# Patient Record
Sex: FEMALE | Race: BLACK OR AFRICAN AMERICAN | NOT HISPANIC OR LATINO | URBAN - METROPOLITAN AREA
[De-identification: names, ages, dates, MRNs, and addresses within clinical notes are randomized per-mention and may not be internally consistent; named-entity substitution may affect disease eponyms.]

---

## 2017-10-29 ENCOUNTER — INPATIENT (INPATIENT)
Facility: HOSPITAL | Age: 82
LOS: 2 days | Discharge: HOME | DRG: 202 | End: 2017-11-01
Attending: FAMILY MEDICINE | Admitting: FAMILY MEDICINE
Payer: MEDICARE

## 2017-10-29 VITALS
HEIGHT: 64 IN | DIASTOLIC BLOOD PRESSURE: 74 MMHG | TEMPERATURE: 98 F | SYSTOLIC BLOOD PRESSURE: 140 MMHG | OXYGEN SATURATION: 95 % | HEART RATE: 83 BPM | RESPIRATION RATE: 24 BRPM | WEIGHT: 169.98 LBS

## 2017-10-29 DIAGNOSIS — Z29.9 ENCOUNTER FOR PROPHYLACTIC MEASURES, UNSPECIFIED: ICD-10-CM

## 2017-10-29 DIAGNOSIS — R05 COUGH: ICD-10-CM

## 2017-10-29 DIAGNOSIS — R07.89 OTHER CHEST PAIN: ICD-10-CM

## 2017-10-29 LAB
ANION GAP SERPL CALC-SCNC: 6 MMOL/L — SIGNIFICANT CHANGE UP (ref 5–17)
APPEARANCE UR: CLEAR — SIGNIFICANT CHANGE UP
APPEARANCE UR: CLEAR — SIGNIFICANT CHANGE UP
APTT BLD: 22.3 SEC — LOW (ref 27.5–37.4)
BACTERIA # UR AUTO: ABNORMAL /HPF
BASOPHILS # BLD AUTO: 0.1 K/UL — SIGNIFICANT CHANGE UP (ref 0–0.2)
BASOPHILS NFR BLD AUTO: 1.2 % — SIGNIFICANT CHANGE UP (ref 0–2)
BILIRUB UR-MCNC: NEGATIVE — SIGNIFICANT CHANGE UP
BILIRUB UR-MCNC: NEGATIVE — SIGNIFICANT CHANGE UP
BUN SERPL-MCNC: 16 MG/DL — SIGNIFICANT CHANGE UP (ref 7–18)
CALCIUM SERPL-MCNC: 9.7 MG/DL — SIGNIFICANT CHANGE UP (ref 8.4–10.5)
CHLORIDE SERPL-SCNC: 106 MMOL/L — SIGNIFICANT CHANGE UP (ref 96–108)
CK MB BLD-MCNC: 0.9 % — SIGNIFICANT CHANGE UP (ref 0–3.5)
CK MB BLD-MCNC: 1.4 % — SIGNIFICANT CHANGE UP (ref 0–3.5)
CK MB CFR SERPL CALC: 1.2 NG/ML — SIGNIFICANT CHANGE UP (ref 0–3.6)
CK MB CFR SERPL CALC: 1.7 NG/ML — SIGNIFICANT CHANGE UP (ref 0–3.6)
CK SERPL-CCNC: 124 U/L — SIGNIFICANT CHANGE UP (ref 21–215)
CK SERPL-CCNC: 131 U/L — SIGNIFICANT CHANGE UP (ref 21–215)
CO2 SERPL-SCNC: 27 MMOL/L — SIGNIFICANT CHANGE UP (ref 22–31)
COLOR SPEC: YELLOW — SIGNIFICANT CHANGE UP
COLOR SPEC: YELLOW — SIGNIFICANT CHANGE UP
CREAT SERPL-MCNC: 0.62 MG/DL — SIGNIFICANT CHANGE UP (ref 0.5–1.3)
DIFF PNL FLD: NEGATIVE — SIGNIFICANT CHANGE UP
DIFF PNL FLD: NEGATIVE — SIGNIFICANT CHANGE UP
EOSINOPHIL # BLD AUTO: 0.2 K/UL — SIGNIFICANT CHANGE UP (ref 0–0.5)
EOSINOPHIL NFR BLD AUTO: 2.8 % — SIGNIFICANT CHANGE UP (ref 0–6)
EPI CELLS # UR: SIGNIFICANT CHANGE UP /HPF
GLUCOSE SERPL-MCNC: 79 MG/DL — SIGNIFICANT CHANGE UP (ref 70–99)
GLUCOSE UR QL: 50 MG/DL
GLUCOSE UR QL: 50 MG/DL
HCT VFR BLD CALC: 44.2 % — SIGNIFICANT CHANGE UP (ref 34.5–45)
HGB BLD-MCNC: 14.4 G/DL — SIGNIFICANT CHANGE UP (ref 11.5–15.5)
INR BLD: 0.99 RATIO — SIGNIFICANT CHANGE UP (ref 0.88–1.16)
KETONES UR-MCNC: NEGATIVE — SIGNIFICANT CHANGE UP
KETONES UR-MCNC: NEGATIVE — SIGNIFICANT CHANGE UP
LEUKOCYTE ESTERASE UR-ACNC: NEGATIVE — SIGNIFICANT CHANGE UP
LEUKOCYTE ESTERASE UR-ACNC: NEGATIVE — SIGNIFICANT CHANGE UP
LYMPHOCYTES # BLD AUTO: 2.5 K/UL — SIGNIFICANT CHANGE UP (ref 1–3.3)
LYMPHOCYTES # BLD AUTO: 37.3 % — SIGNIFICANT CHANGE UP (ref 13–44)
MCHC RBC-ENTMCNC: 31.2 PG — SIGNIFICANT CHANGE UP (ref 27–34)
MCHC RBC-ENTMCNC: 32.6 GM/DL — SIGNIFICANT CHANGE UP (ref 32–36)
MCV RBC AUTO: 95.7 FL — SIGNIFICANT CHANGE UP (ref 80–100)
MONOCYTES # BLD AUTO: 0.6 K/UL — SIGNIFICANT CHANGE UP (ref 0–0.9)
MONOCYTES NFR BLD AUTO: 9.5 % — SIGNIFICANT CHANGE UP (ref 2–14)
NEUTROPHILS # BLD AUTO: 3.3 K/UL — SIGNIFICANT CHANGE UP (ref 1.8–7.4)
NEUTROPHILS NFR BLD AUTO: 49.2 % — SIGNIFICANT CHANGE UP (ref 43–77)
NITRITE UR-MCNC: NEGATIVE — SIGNIFICANT CHANGE UP
NITRITE UR-MCNC: NEGATIVE — SIGNIFICANT CHANGE UP
NT-PROBNP SERPL-SCNC: 235 PG/ML — SIGNIFICANT CHANGE UP (ref 0–450)
PH UR: 5 — SIGNIFICANT CHANGE UP (ref 5–8)
PH UR: 5 — SIGNIFICANT CHANGE UP (ref 5–8)
PLATELET # BLD AUTO: 212 K/UL — SIGNIFICANT CHANGE UP (ref 150–400)
POTASSIUM SERPL-MCNC: 4.1 MMOL/L — SIGNIFICANT CHANGE UP (ref 3.5–5.3)
POTASSIUM SERPL-SCNC: 4.1 MMOL/L — SIGNIFICANT CHANGE UP (ref 3.5–5.3)
PROT UR-MCNC: NEGATIVE — SIGNIFICANT CHANGE UP
PROT UR-MCNC: NEGATIVE — SIGNIFICANT CHANGE UP
PROTHROM AB SERPL-ACNC: 10.8 SEC — SIGNIFICANT CHANGE UP (ref 9.8–12.7)
RAPID RVP RESULT: SIGNIFICANT CHANGE UP
RBC # BLD: 4.62 M/UL — SIGNIFICANT CHANGE UP (ref 3.8–5.2)
RBC # FLD: 13.5 % — SIGNIFICANT CHANGE UP (ref 10.3–14.5)
RBC CASTS # UR COMP ASSIST: SIGNIFICANT CHANGE UP /HPF (ref 0–2)
SODIUM SERPL-SCNC: 139 MMOL/L — SIGNIFICANT CHANGE UP (ref 135–145)
SP GR SPEC: 1.01 — SIGNIFICANT CHANGE UP (ref 1.01–1.02)
SP GR SPEC: 1.05 — HIGH (ref 1.01–1.02)
TROPONIN I SERPL-MCNC: <0.015 NG/ML — SIGNIFICANT CHANGE UP (ref 0–0.04)
TROPONIN I SERPL-MCNC: <0.015 NG/ML — SIGNIFICANT CHANGE UP (ref 0–0.04)
UROBILINOGEN FLD QL: NEGATIVE — SIGNIFICANT CHANGE UP
UROBILINOGEN FLD QL: NEGATIVE — SIGNIFICANT CHANGE UP
WBC # BLD: 6.7 K/UL — SIGNIFICANT CHANGE UP (ref 3.8–10.5)
WBC # FLD AUTO: 6.7 K/UL — SIGNIFICANT CHANGE UP (ref 3.8–10.5)
WBC UR QL: SIGNIFICANT CHANGE UP /HPF (ref 0–5)

## 2017-10-29 PROCEDURE — 99285 EMERGENCY DEPT VISIT HI MDM: CPT

## 2017-10-29 PROCEDURE — 71020: CPT | Mod: 26

## 2017-10-29 RX ORDER — ASPIRIN/CALCIUM CARB/MAGNESIUM 324 MG
81 TABLET ORAL DAILY
Qty: 0 | Refills: 0 | Status: DISCONTINUED | OUTPATIENT
Start: 2017-10-30 | End: 2017-11-01

## 2017-10-29 RX ORDER — ALBUTEROL 90 UG/1
2 AEROSOL, METERED ORAL
Qty: 1 | Refills: 0 | OUTPATIENT
Start: 2017-10-29 | End: 2017-11-28

## 2017-10-29 RX ORDER — ASPIRIN/CALCIUM CARB/MAGNESIUM 324 MG
325 TABLET ORAL ONCE
Qty: 0 | Refills: 0 | Status: COMPLETED | OUTPATIENT
Start: 2017-10-29 | End: 2017-10-29

## 2017-10-29 RX ORDER — ENOXAPARIN SODIUM 100 MG/ML
40 INJECTION SUBCUTANEOUS DAILY
Qty: 0 | Refills: 0 | Status: DISCONTINUED | OUTPATIENT
Start: 2017-10-29 | End: 2017-11-01

## 2017-10-29 RX ORDER — ALBUTEROL 90 UG/1
3 AEROSOL, METERED ORAL
Qty: 30 | Refills: 0 | OUTPATIENT
Start: 2017-10-29 | End: 2017-11-28

## 2017-10-29 RX ORDER — ATORVASTATIN CALCIUM 80 MG/1
40 TABLET, FILM COATED ORAL AT BEDTIME
Qty: 0 | Refills: 0 | Status: DISCONTINUED | OUTPATIENT
Start: 2017-10-29 | End: 2017-11-01

## 2017-10-29 RX ORDER — ALBUTEROL 90 UG/1
1 AEROSOL, METERED ORAL EVERY 4 HOURS
Qty: 0 | Refills: 0 | Status: DISCONTINUED | OUTPATIENT
Start: 2017-10-29 | End: 2017-10-29

## 2017-10-29 RX ORDER — IPRATROPIUM/ALBUTEROL SULFATE 18-103MCG
3 AEROSOL WITH ADAPTER (GRAM) INHALATION
Qty: 0 | Refills: 0 | Status: COMPLETED | OUTPATIENT
Start: 2017-10-29 | End: 2017-10-29

## 2017-10-29 RX ORDER — TIOTROPIUM BROMIDE 18 UG/1
1 CAPSULE ORAL; RESPIRATORY (INHALATION) DAILY
Qty: 0 | Refills: 0 | Status: DISCONTINUED | OUTPATIENT
Start: 2017-10-29 | End: 2017-11-01

## 2017-10-29 RX ORDER — IPRATROPIUM/ALBUTEROL SULFATE 18-103MCG
3 AEROSOL WITH ADAPTER (GRAM) INHALATION EVERY 6 HOURS
Qty: 0 | Refills: 0 | Status: DISCONTINUED | OUTPATIENT
Start: 2017-10-29 | End: 2017-11-01

## 2017-10-29 RX ORDER — SODIUM CHLORIDE 9 MG/ML
3 INJECTION INTRAMUSCULAR; INTRAVENOUS; SUBCUTANEOUS ONCE
Qty: 0 | Refills: 0 | Status: COMPLETED | OUTPATIENT
Start: 2017-10-29 | End: 2017-10-29

## 2017-10-29 RX ADMIN — Medication 125 MILLIGRAM(S): at 15:37

## 2017-10-29 RX ADMIN — Medication 3 MILLILITER(S): at 17:10

## 2017-10-29 RX ADMIN — Medication 3 MILLILITER(S): at 15:37

## 2017-10-29 RX ADMIN — Medication 3 MILLILITER(S): at 15:28

## 2017-10-29 RX ADMIN — ATORVASTATIN CALCIUM 40 MILLIGRAM(S): 80 TABLET, FILM COATED ORAL at 21:25

## 2017-10-29 RX ADMIN — SODIUM CHLORIDE 3 MILLILITER(S): 9 INJECTION INTRAMUSCULAR; INTRAVENOUS; SUBCUTANEOUS at 15:29

## 2017-10-29 RX ADMIN — Medication 325 MILLIGRAM(S): at 19:00

## 2017-10-29 NOTE — H&P ADULT - HISTORY OF PRESENT ILLNESS
81yo F with h/o Crohns disease, asthma, breast cancer s/p mastectomy (1988, and 2015), arthritis, and GERD came in with c/o nonproductive cough, wheezing, and SOB since the past couple of days. She said she has SOB at rest and exertion. Denies chest pain or tightness,. She said she has been using her albuterol inhaler but the cough has been persistent. Denies any recent use of steroids or antibiotics. Denies fevers, chills, nausea, or vomiting.  Patient does not remember her medication list. She said she was at friend's home who recommended her to come to ER due to persistent cough and hoarse voice. She also does not remember the name of her pharmacy. She said she walks independently and is active at baseline. She lives by herself and  does the household work. Also drives a car and does her grocery. Denies smoking, alcohol or any other drug use.

## 2017-10-29 NOTE — H&P ADULT - ATTENDING COMMENTS
Patient seen and examined. Case fully discussed with  ER attending and medical resident. Reviewed chief complaint. Reviewed review of systems. Reviewed list of medications.  Reviewed physical exam.  Reviewed assessment and plan. agree with full H and P. Will follow up clinically.

## 2017-10-29 NOTE — H&P ADULT - NSHPPHYSICALEXAM_GEN_ALL_CORE
Vital Signs Last 24 Hrs  T(C): 36.6 (29 Oct 2017 14:14), Max: 36.6 (29 Oct 2017 14:14)  T(F): 97.9 (29 Oct 2017 14:14), Max: 97.9 (29 Oct 2017 14:14)  HR: 83 (29 Oct 2017 14:14) (83 - 83)  BP: 140/74 (29 Oct 2017 14:14) (140/74 - 140/74)  BP(mean): --  RR: 24 (29 Oct 2017 14:14) (24 - 24)  SpO2: 95% (29 Oct 2017 14:14) (95% - 95%)    GENERAL: NAD  HEAD:  Atraumatic, Normocephalic  EYES: EOMI, PERRLA, conjunctiva and sclera clear  ENMT: Moist mucous membranes  NECK: Supple  NERVOUS SYSTEM:  Alert & Oriented X3  CHEST/LUNG: Clear to auscultation bilaterally; No rales, rhonchi, wheezing, or rubs  HEART: Regular rate and rhythm; No murmurs, rubs, or gallops  ABDOMEN: Soft, Nontender, Nondistended; Bowel sounds present  EXTREMITIES:  2+ Peripheral Pulses, No clubbing, cyanosis, or edema

## 2017-10-29 NOTE — H&P ADULT - PROBLEM SELECTOR PLAN 2
Per ED, patient was complaining of chest tightness  - denies chest pain or tightness on my initial encounter  - EKG-NSR  - T1- negative  - Trend CE  - c/w ASA and Statin

## 2017-10-29 NOTE — ED PROVIDER NOTE - OBJECTIVE STATEMENT
83 y/o F pt with PMHx of asthma presents to ED c/o worsening nonproductive cough and wheezing that worsens with exertion x 2 days. Pt relays that she used albuterol with mild symptomatic relief. Pt reports that she is looking for a pulmonologist. Pt denies chest pain, dizziness, leg swelling, fever, chills, or any other complaints. ALLERGIES: penicillin (unknown). 83 y/o F pt with PMHx of asthma presents to ED c/o worsening nonproductive cough and wheezing that worsens with exertion x 2 days. Pt relays that she used albuterol with mild symptomatic relief. Later reports chest pressure and heaviness over saem time, worse with lying position. Pt reports that she is looking for a pulmonologist. Pt denies leg swelling, fever, chills, or any other complaints. ALLERGIES: penicillin (unknown).

## 2017-10-29 NOTE — H&P ADULT - NSHPLABSRESULTS_GEN_ALL_CORE
14.4   6.7   )-----------( 212      ( 29 Oct 2017 15:29 )             44.2   10-29    139  |  106  |  16  ----------------------------<  79  4.1   |  27  |  0.62    Ca    9.7      29 Oct 2017 15:28      < from: Xray Chest 2 Views PA/Lat (10.29.17 @ 15:55) >      EXAM:  CHEST PA & LAT                            PROCEDURE DATE:  10/29/2017          INTERPRETATION:  CLINICAL INDICATION: Pneumonia.    PA and lateral views of the chest are provided.    COMPARISON: Chest x-ray 1/8/2015.    FINDINGS: The lungs are clear. Costophrenic angles are sharp. The   cardiomediastinal silhouette is within normal limits of size.   Degenerative changes of the spine are present.    IMPRESSION: Clear lungs.        < end of copied text >

## 2017-10-29 NOTE — H&P ADULT - ASSESSMENT
83yo F with h/o Crohns disease, asthma, breast cancer s/p mastectomy (1988, and 2015), arthritis, and GERD came in with c/o nonproductive cough, wheezing, and SOB since the past couple of days. She said she has SOB at rest and exertion. Denies chest pain or tightness,. She said she has been using her albuterol inhaler but the cough has been persistent. Denies any recent use of steroids or antibiotics. Denies fevers, chills, nausea, or vomiting.  Patient does not remember her medication list. She said she was at friend's home who recommended her to come to ER due to persistent cough and hoarse voice. She also does not remember the name of her pharmacy. She said she walks independently and is active at baseline. She lives by herself and  does the household work. Also drives a car and does her grocery. Denies smoking, alcohol or any other drug use.     PRIMARY TEAM TO PLEASE OBTAIN MEDICATION LIST IN AM FROM THE PHARMACY.

## 2017-10-29 NOTE — H&P ADULT - PROBLEM SELECTOR PLAN 1
Asthma exacerbation vs Viral bronchitis  - f/u RVP  - duonebs  - Prednisone 40mg qday x 5 days  - benzonatate  - guaifenesin  - Unlikely cardiac- patient said she had outpatient workup done recently which was normal. BNP- wnl. EKG- NSR, T1- negative. Denies any chest pain or tightness

## 2017-10-29 NOTE — ED PROVIDER NOTE - CHPI ED SYMPTOMS NEG
no fever/no chills/no chest pain/no dizziness, no leg swelling no fever/no chills/no dizziness, no leg swelling

## 2017-10-29 NOTE — H&P ADULT - NSHPREVIEWOFSYSTEMS_GEN_ALL_CORE
REVIEW OF SYSTEMS:  CONSTITUTIONAL: No fever, weight loss, or fatigue  EYES: No eye pain, visual disturbances, or discharge  ENMT:  No difficulty hearing, tinnitus, vertigo; No sinus or throat pain  NECK: No pain or stiffness  RESPIRATORY:  cough, SOB, No wheezing, chills or hemoptysis  CARDIOVASCULAR: No chest pain, palpitations, dizziness, or leg swelling  GASTROINTESTINAL: No abdominal or epigastric pain. No nausea, vomiting, or hematemesis; No diarrhea or constipation. No melena or hematochezia.  GENITOURINARY: No dysuria, frequency, hematuria, or incontinence  NEUROLOGICAL: No headaches, memory loss, loss of strength, numbness, or tremors  SKIN: No itching, burning, rashes, or lesions   LYMPH NODES: No enlarged glands  ENDOCRINE: No heat or cold intolerance; No hair loss  MUSCULOSKELETAL: No joint pain or swelling; No muscle, back, or extremity pain

## 2017-10-29 NOTE — ED PROVIDER NOTE - PROGRESS NOTE DETAILS
Pt's symptoms have improved. Pt reports that she would like to go home. On reexamination, lung are clear. Per pt's request, f/u with pulmonologist was given. Pt improved s/p nebs, agrees to admission for cardiac w/u, improvement of symptoms.

## 2017-10-29 NOTE — ED PROVIDER NOTE - MEDICAL DECISION MAKING DETAILS
83 y/o F pt with PMHx of asthma presents with nonproductive cough and wheezing. Will obtain CXR r/o PNA, give trial of NEBS and reassess.

## 2017-10-29 NOTE — ED ADULT TRIAGE NOTE - CHIEF COMPLAINT QUOTE
as per the pt " I have the difficulty in breathing for 2 days " pt has h/o heart  murmurs. I have the asthma history

## 2017-10-30 LAB
ANION GAP SERPL CALC-SCNC: 8 MMOL/L — SIGNIFICANT CHANGE UP (ref 5–17)
BASOPHILS # BLD AUTO: 0.1 K/UL — SIGNIFICANT CHANGE UP (ref 0–0.2)
BASOPHILS NFR BLD AUTO: 0.7 % — SIGNIFICANT CHANGE UP (ref 0–2)
BUN SERPL-MCNC: 19 MG/DL — HIGH (ref 7–18)
CALCIUM SERPL-MCNC: 9.9 MG/DL — SIGNIFICANT CHANGE UP (ref 8.4–10.5)
CHLORIDE SERPL-SCNC: 104 MMOL/L — SIGNIFICANT CHANGE UP (ref 96–108)
CHOLEST SERPL-MCNC: 250 MG/DL — HIGH (ref 10–199)
CO2 SERPL-SCNC: 25 MMOL/L — SIGNIFICANT CHANGE UP (ref 22–31)
CREAT SERPL-MCNC: 0.79 MG/DL — SIGNIFICANT CHANGE UP (ref 0.5–1.3)
EOSINOPHIL # BLD AUTO: 0 K/UL — SIGNIFICANT CHANGE UP (ref 0–0.5)
EOSINOPHIL NFR BLD AUTO: 0 % — SIGNIFICANT CHANGE UP (ref 0–6)
GLUCOSE BLDC GLUCOMTR-MCNC: 160 MG/DL — HIGH (ref 70–99)
GLUCOSE SERPL-MCNC: 137 MG/DL — HIGH (ref 70–99)
HCT VFR BLD CALC: 45.2 % — HIGH (ref 34.5–45)
HDLC SERPL-MCNC: 83 MG/DL — SIGNIFICANT CHANGE UP (ref 40–125)
HGB BLD-MCNC: 14.5 G/DL — SIGNIFICANT CHANGE UP (ref 11.5–15.5)
LIPID PNL WITH DIRECT LDL SERPL: 158 MG/DL — SIGNIFICANT CHANGE UP
LYMPHOCYTES # BLD AUTO: 1.8 K/UL — SIGNIFICANT CHANGE UP (ref 1–3.3)
LYMPHOCYTES # BLD AUTO: 21 % — SIGNIFICANT CHANGE UP (ref 13–44)
MCHC RBC-ENTMCNC: 31.2 PG — SIGNIFICANT CHANGE UP (ref 27–34)
MCHC RBC-ENTMCNC: 32.1 GM/DL — SIGNIFICANT CHANGE UP (ref 32–36)
MCV RBC AUTO: 97 FL — SIGNIFICANT CHANGE UP (ref 80–100)
MONOCYTES # BLD AUTO: 0.4 K/UL — SIGNIFICANT CHANGE UP (ref 0–0.9)
MONOCYTES NFR BLD AUTO: 4.9 % — SIGNIFICANT CHANGE UP (ref 2–14)
NEUTROPHILS # BLD AUTO: 6.1 K/UL — SIGNIFICANT CHANGE UP (ref 1.8–7.4)
NEUTROPHILS NFR BLD AUTO: 73.3 % — SIGNIFICANT CHANGE UP (ref 43–77)
PLATELET # BLD AUTO: 199 K/UL — SIGNIFICANT CHANGE UP (ref 150–400)
POTASSIUM SERPL-MCNC: 4.2 MMOL/L — SIGNIFICANT CHANGE UP (ref 3.5–5.3)
POTASSIUM SERPL-SCNC: 4.2 MMOL/L — SIGNIFICANT CHANGE UP (ref 3.5–5.3)
RBC # BLD: 4.66 M/UL — SIGNIFICANT CHANGE UP (ref 3.8–5.2)
RBC # FLD: 13.5 % — SIGNIFICANT CHANGE UP (ref 10.3–14.5)
SODIUM SERPL-SCNC: 137 MMOL/L — SIGNIFICANT CHANGE UP (ref 135–145)
TOTAL CHOLESTEROL/HDL RATIO MEASUREMENT: 3 RATIO — LOW (ref 3.3–7.1)
TRIGL SERPL-MCNC: 44 MG/DL — SIGNIFICANT CHANGE UP (ref 10–149)
WBC # BLD: 8.3 K/UL — SIGNIFICANT CHANGE UP (ref 3.8–10.5)
WBC # FLD AUTO: 8.3 K/UL — SIGNIFICANT CHANGE UP (ref 3.8–10.5)

## 2017-10-30 RX ADMIN — Medication 40 MILLIGRAM(S): at 07:01

## 2017-10-30 RX ADMIN — Medication 200 MILLIGRAM(S): at 07:00

## 2017-10-30 RX ADMIN — Medication 3 MILLILITER(S): at 20:58

## 2017-10-30 RX ADMIN — Medication 100 MILLIGRAM(S): at 12:52

## 2017-10-30 RX ADMIN — Medication 3 MILLILITER(S): at 15:18

## 2017-10-30 RX ADMIN — Medication 200 MILLIGRAM(S): at 12:55

## 2017-10-30 RX ADMIN — Medication 81 MILLIGRAM(S): at 12:51

## 2017-10-30 RX ADMIN — ENOXAPARIN SODIUM 40 MILLIGRAM(S): 100 INJECTION SUBCUTANEOUS at 12:52

## 2017-10-30 NOTE — PROGRESS NOTE ADULT - SUBJECTIVE AND OBJECTIVE BOX
CHIEF COMPLAINT:Patient is a 82y old  Female who presents with a chief complaint of cough (29 Oct 2017 19:36)    	  REVIEW OF SYSTEMS:  CONSTITUTIONAL: No fever, weight loss, or fatigue  EYES: No eye pain, visual disturbances, or discharge  ENMT:  No difficulty hearing, tinnitus, vertigo; No sinus or throat pain  NECK: No pain or stiffness  RESPIRATORY: No cough, wheezing, chills or hemoptysis; No Shortness of Breath  CARDIOVASCULAR: No chest pain, palpitations, passing out, dizziness, or leg swelling  GASTROINTESTINAL: No abdominal or epigastric pain. No nausea, vomiting, or hematemesis; No diarrhea or constipation. No melena or hematochezia.  GENITOURINARY: No dysuria, frequency, hematuria, or incontinence  NEUROLOGICAL: No headaches, memory loss, loss of strength, numbness, or tremors  SKIN: No itching, burning, rashes, or lesions   LYMPH Nodes: No enlarged glands  ENDOCRINE: No heat or cold intolerance; No hair loss  MUSCULOSKELETAL: No joint pain or swelling; No muscle, back, or extremity pain  PSYCHIATRIC: No depression, anxiety, mood swings, or difficulty sleeping  HEME/LYMPH: No easy bruising, or bleeding gums  ALLERY AND IMMUNOLOGIC: No hives or eczema	    [ ] All others negative	  [ ] Unable to obtain    PHYSICAL EXAM:  T(C): 36.6 (10-30-17 @ 22:09), Max: 36.8 (10-30-17 @ 05:42)  HR: 81 (10-30-17 @ 22:09) (81 - 96)  BP: 99/50 (10-30-17 @ 22:09) (99/50 - 127/80)  RR: 16 (10-30-17 @ 22:09) (16 - 22)  SpO2: 96% (10-30-17 @ 22:09) (92% - 99%)  Wt(kg): --  I&O's Summary      Appearance: Normal	  HEENT:   Normal oral mucosa, PERRL, EOMI	  Lymphatic: No lymphadenopathy  Cardiovascular: Normal S1 S2, No JVD, No murmurs, No edema  Respiratory: Lungs clear to auscultation	  Psychiatry: A & O x 3, Mood & affect appropriate  Gastrointestinal:  Soft, Non-tender, + BS	  Skin: No rashes, No ecchymoses, No cyanosis	  Neurologic: Non-focal  Extremities: Normal range of motion, No clubbing, cyanosis or edema  Vascular: Peripheral pulses palpable 2+ bilaterally    MEDICATIONS  (STANDING):  ALBUTerol/ipratropium for Nebulization 3 milliLiter(s) Nebulizer every 6 hours  aspirin  chewable 81 milliGRAM(s) Oral daily  atorvastatin 40 milliGRAM(s) Oral at bedtime  benzonatate 100 milliGRAM(s) Oral daily  enoxaparin Injectable 40 milliGRAM(s) SubCutaneous daily  guaiFENesin    Syrup 200 milliGRAM(s) Oral every 6 hours  predniSONE   Tablet 40 milliGRAM(s) Oral daily  tiotropium 18 MICROgram(s) Capsule 1 Capsule(s) Inhalation daily      TELEMETRY: 	    ECG:  	  RADIOLOGY:  OTHER: 	  	  CBC Full  -  ( 30 Oct 2017 06:27 )  WBC Count : 8.3 K/uL  Hemoglobin : 14.5 g/dL  Hematocrit : 45.2 %  Platelet Count - Automated : 199 K/uL  Mean Cell Volume : 97.0 fl  Mean Cell Hemoglobin : 31.2 pg  Mean Cell Hemoglobin Concentration : 32.1 gm/dL  Auto Neutrophil # : 6.1 K/uL  Auto Lymphocyte # : 1.8 K/uL  Auto Monocyte # : 0.4 K/uL  Auto Eosinophil # : 0.0 K/uL  Auto Basophil # : 0.1 K/uL  Auto Neutrophil % : 73.3 %  Auto Lymphocyte % : 21.0 %  Auto Monocyte % : 4.9 %  Auto Eosinophil % : 0.0 %  Auto Basophil % : 0.7 %        CARDIAC MARKERS:  CARDIAC MARKERS ( 29 Oct 2017 21:44 )  <0.015 ng/mL / x     / 131 U/L / x     / 1.2 ng/mL  CARDIAC MARKERS ( 29 Oct 2017 15:28 )  <0.015 ng/mL / x     / 124 U/L / x     / 1.7 ng/mL                              14.5   8.3   )-----------( 199      ( 30 Oct 2017 06:27 )             45.2       10-30    137  |  104  |  19<H>  ----------------------------<  137<H>  4.2   |  25  |  0.79    Ca    9.9      30 Oct 2017 06:27        PT/INR - ( 29 Oct 2017 15:29 )   PT: 10.8 sec;   INR: 0.99 ratio         PTT - ( 29 Oct 2017 15:29 )  PTT:22.3 sec    Urinalysis Basic - ( 29 Oct 2017 23:21 )    Color: Yellow / Appearance: Clear / S.050 / pH: x  Gluc: x / Ketone: Negative  / Bili: Negative / Urobili: Negative   Blood: x / Protein: Negative / Nitrite: Negative   Leuk Esterase: Negative / RBC: x / WBC x   Sq Epi: x / Non Sq Epi: x / Bacteria: x      proBNP: Serum Pro-Brain Natriuretic Peptide: 235 pg/mL (10-29 @ 15:28)    Lipid Profile: Cholesterol 250    HDL 83  TG 44    HgA1c:   TSH:

## 2017-10-30 NOTE — ED ADULT NURSE REASSESSMENT NOTE - NS ED NURSE REASSESS COMMENT FT1
at 8:10 am Pt demanded to leave ED to move her car NEO Matias and nurse manager Jackelin made aware IV Line tele box removed Pt promised to come back ASAP at 10AM RN called Pt phone # no answer NEO Matias and nurse in charge Michaela made aware. at 8:10 am Pt demanded to leave ED NP Polly and nurse manager Jackelin made aware IV Line tele box removed pt signed out AMA, risk and benefits explained to the patient, patient verbalized understanding

## 2017-10-30 NOTE — ED ADULT NURSE REASSESSMENT NOTE - NS ED NURSE REASSESS COMMENT FT1
Pt denies any discomfort at present attached to N/C 2lit/min telemetry D/C by NEO Garcia admited to 5S hand off report given to Melody WELLS

## 2017-10-30 NOTE — ED ADULT NURSE REASSESSMENT NOTE - NS ED NURSE REASSESS COMMENT FT1
REceived Pt alert verbally responsive NAD telemonitoring in progress pt transferred to holding endorsed to Netta WELLS REceived Pt alert verbally responsive NAD telemonitoring in progress

## 2017-10-30 NOTE — PROGRESS NOTE ADULT - ASSESSMENT
83yo F with h/o Crohns disease, asthma, breast cancer s/p mastectomy (1988, and 2015), arthritis, and GERD came in with c/o nonproductive cough, wheezing, and SOB since the past couple of days. She said she has SOB at rest and exertion. Denies chest pain or tightness,. She said she has been using her albuterol inhaler but the cough has been persistent. Denies any recent use of steroids or antibiotics. Denies fevers, chills, nausea, or vomiting.  Patient does not remember her medication list. She said she was at friend's home who recommended her to come to ER due to persistent cough and hoarse voice. She also does not remember the name of her pharmacy. She said she walks independently and is active at baseline. She lives by herself and  does the household work. Also drives a car and does her grocery. Denies smoking, alcohol or any other drug use.     PRIMARY TEAM TO PLEASE OBTAIN MEDICATION LIST IN AM FROM THE PHARMACY. Patient is more stable today . With no specific  complaints.

## 2017-10-31 LAB
ANION GAP SERPL CALC-SCNC: 7 MMOL/L — SIGNIFICANT CHANGE UP (ref 5–17)
BUN SERPL-MCNC: 20 MG/DL — HIGH (ref 7–18)
CALCIUM SERPL-MCNC: 9.5 MG/DL — SIGNIFICANT CHANGE UP (ref 8.4–10.5)
CHLORIDE SERPL-SCNC: 102 MMOL/L — SIGNIFICANT CHANGE UP (ref 96–108)
CO2 SERPL-SCNC: 29 MMOL/L — SIGNIFICANT CHANGE UP (ref 22–31)
CREAT SERPL-MCNC: 0.82 MG/DL — SIGNIFICANT CHANGE UP (ref 0.5–1.3)
GLUCOSE BLDC GLUCOMTR-MCNC: 125 MG/DL — HIGH (ref 70–99)
GLUCOSE SERPL-MCNC: 96 MG/DL — SIGNIFICANT CHANGE UP (ref 70–99)
HCT VFR BLD CALC: 45.3 % — HIGH (ref 34.5–45)
HGB BLD-MCNC: 14.9 G/DL — SIGNIFICANT CHANGE UP (ref 11.5–15.5)
MCHC RBC-ENTMCNC: 31.7 PG — SIGNIFICANT CHANGE UP (ref 27–34)
MCHC RBC-ENTMCNC: 32.8 GM/DL — SIGNIFICANT CHANGE UP (ref 32–36)
MCV RBC AUTO: 96.7 FL — SIGNIFICANT CHANGE UP (ref 80–100)
PLATELET # BLD AUTO: 198 K/UL — SIGNIFICANT CHANGE UP (ref 150–400)
POTASSIUM SERPL-MCNC: 4.2 MMOL/L — SIGNIFICANT CHANGE UP (ref 3.5–5.3)
POTASSIUM SERPL-SCNC: 4.2 MMOL/L — SIGNIFICANT CHANGE UP (ref 3.5–5.3)
RBC # BLD: 4.69 M/UL — SIGNIFICANT CHANGE UP (ref 3.8–5.2)
RBC # FLD: 13.4 % — SIGNIFICANT CHANGE UP (ref 10.3–14.5)
SODIUM SERPL-SCNC: 138 MMOL/L — SIGNIFICANT CHANGE UP (ref 135–145)
WBC # BLD: 9 K/UL — SIGNIFICANT CHANGE UP (ref 3.8–10.5)
WBC # FLD AUTO: 9 K/UL — SIGNIFICANT CHANGE UP (ref 3.8–10.5)

## 2017-10-31 RX ORDER — PANTOPRAZOLE SODIUM 20 MG/1
40 TABLET, DELAYED RELEASE ORAL
Qty: 0 | Refills: 0 | Status: DISCONTINUED | OUTPATIENT
Start: 2017-10-31 | End: 2017-11-01

## 2017-10-31 RX ORDER — PANTOPRAZOLE SODIUM 20 MG/1
40 TABLET, DELAYED RELEASE ORAL ONCE
Qty: 0 | Refills: 0 | Status: COMPLETED | OUTPATIENT
Start: 2017-10-31 | End: 2017-10-31

## 2017-10-31 RX ORDER — SENNA PLUS 8.6 MG/1
2 TABLET ORAL AT BEDTIME
Qty: 0 | Refills: 0 | Status: DISCONTINUED | OUTPATIENT
Start: 2017-10-31 | End: 2017-11-01

## 2017-10-31 RX ORDER — BENZOCAINE AND MENTHOL 5; 1 G/100ML; G/100ML
1 LIQUID ORAL
Qty: 0 | Refills: 0 | Status: COMPLETED | OUTPATIENT
Start: 2017-10-31 | End: 2017-11-01

## 2017-10-31 RX ORDER — MESALAMINE 400 MG
800 TABLET, DELAYED RELEASE (ENTERIC COATED) ORAL
Qty: 0 | Refills: 0 | Status: DISCONTINUED | OUTPATIENT
Start: 2017-10-31 | End: 2017-11-01

## 2017-10-31 RX ORDER — DOCUSATE SODIUM 100 MG
100 CAPSULE ORAL DAILY
Qty: 0 | Refills: 0 | Status: DISCONTINUED | OUTPATIENT
Start: 2017-10-31 | End: 2017-11-01

## 2017-10-31 RX ORDER — FLUTICASONE PROPIONATE 50 MCG
1 SPRAY, SUSPENSION NASAL
Qty: 0 | Refills: 0 | Status: DISCONTINUED | OUTPATIENT
Start: 2017-10-31 | End: 2017-11-01

## 2017-10-31 RX ORDER — LETROZOLE 2.5 MG/1
2.5 TABLET, FILM COATED ORAL DAILY
Qty: 0 | Refills: 0 | Status: DISCONTINUED | OUTPATIENT
Start: 2017-10-31 | End: 2017-11-01

## 2017-10-31 RX ADMIN — Medication 200 MILLIGRAM(S): at 00:31

## 2017-10-31 RX ADMIN — Medication 1 SPRAY(S): at 21:57

## 2017-10-31 RX ADMIN — Medication 200 MILLIGRAM(S): at 11:58

## 2017-10-31 RX ADMIN — Medication 40 MILLIGRAM(S): at 06:44

## 2017-10-31 RX ADMIN — ENOXAPARIN SODIUM 40 MILLIGRAM(S): 100 INJECTION SUBCUTANEOUS at 11:56

## 2017-10-31 RX ADMIN — Medication 3 MILLILITER(S): at 15:10

## 2017-10-31 RX ADMIN — Medication 200 MILLIGRAM(S): at 06:00

## 2017-10-31 RX ADMIN — BENZOCAINE AND MENTHOL 1 LOZENGE: 5; 1 LIQUID ORAL at 19:39

## 2017-10-31 RX ADMIN — Medication 100 MILLIGRAM(S): at 16:38

## 2017-10-31 RX ADMIN — BENZOCAINE AND MENTHOL 1 LOZENGE: 5; 1 LIQUID ORAL at 21:57

## 2017-10-31 RX ADMIN — Medication 1 SPRAY(S): at 07:07

## 2017-10-31 RX ADMIN — Medication 81 MILLIGRAM(S): at 11:57

## 2017-10-31 RX ADMIN — SENNA PLUS 2 TABLET(S): 8.6 TABLET ORAL at 22:47

## 2017-10-31 RX ADMIN — BENZOCAINE AND MENTHOL 1 LOZENGE: 5; 1 LIQUID ORAL at 18:00

## 2017-10-31 RX ADMIN — PANTOPRAZOLE SODIUM 40 MILLIGRAM(S): 20 TABLET, DELAYED RELEASE ORAL at 19:32

## 2017-10-31 RX ADMIN — Medication 100 MILLIGRAM(S): at 22:47

## 2017-10-31 RX ADMIN — PANTOPRAZOLE SODIUM 40 MILLIGRAM(S): 20 TABLET, DELAYED RELEASE ORAL at 06:44

## 2017-10-31 RX ADMIN — Medication 3 MILLILITER(S): at 20:28

## 2017-10-31 RX ADMIN — Medication 3 MILLILITER(S): at 09:15

## 2017-10-31 RX ADMIN — Medication 800 MILLIGRAM(S): at 09:11

## 2017-10-31 RX ADMIN — LETROZOLE 2.5 MILLIGRAM(S): 2.5 TABLET, FILM COATED ORAL at 11:57

## 2017-10-31 RX ADMIN — Medication 800 MILLIGRAM(S): at 16:41

## 2017-10-31 NOTE — PROGRESS NOTE ADULT - ASSESSMENT
81yo F with h/o Crohns disease, asthma, breast cancer s/p mastectomy (1988, and 2015), arthritis, and GERD came in with c/o nonproductive cough, wheezing, and SOB since the past couple of days. She said she has SOB at rest and exertion. Denies chest pain or tightness,. She said she has been using her albuterol inhaler but the cough has been persistent. Denies any recent use of steroids or antibiotics. Denies fevers, chills, nausea, or vomiting.  Patient does not remember her medication list. She said she was at friend's home who recommended her to come to ER due to persistent cough and hoarse voice. She also does not remember the name of her pharmacy. She said she walks independently and is active at baseline. She lives by herself and  does the household work. Also drives a car and does her grocery. Denies smoking, alcohol or any other drug use.     PRIMARY TEAM TO PLEASE OBTAIN MEDICATION LIST IN AM FROM THE PHARMACY. Patient is more stable today . With no specific  complaints. 83yo F with h/o Crohns disease, asthma, breast cancer s/p mastectomy (1988, and 2015), arthritis, and GERD came in with c/o nonproductive cough, wheezing, and SOB x 2 days. Pt admitted for suspected asthma exacerbation    Pt currently feels well and presents in no acute distress.  Will continue duoneb and oral steroid therapy for now.

## 2017-10-31 NOTE — PROGRESS NOTE ADULT - ATTENDING COMMENTS
Patient is seen and examined. Case reviewed with the medical team. Above note is appreciated. Will follow up clinically. Continue DVT prophylaxis. Will continue inhalers and follow up with Pulmonary for consultation.
Patient is seen and examined. Case reviewed with the medical team. Above note is appreciated. Will follow up clinically. Continue DVT prophylaxis. Case discussed with Cardiology. Continue inhalers.

## 2017-10-31 NOTE — PROGRESS NOTE ADULT - PROBLEM SELECTOR PLAN 2
Per ED, patient was complaining of chest tightness  - denies chest pain or tightness on my initial encounter  - EKG-NSR  - T1- negative  - Trend CE  - c/w ASA and Statin Per ED, patient was complaining of chest tightness  - denies chest pain or tightness  - EKG-NSR  - Trops neg x 3  - c/w ASA and Statin

## 2017-10-31 NOTE — PROVIDER CONTACT NOTE (OTHER) - ACTION/TREATMENT ORDERED:
DR. Monk said BP is alright. no IVF to give. he saw and talked with the pt. and he ordered  some medication for 0600. she refused LIPITOR 40 MG PO. Dr. Monk notified.

## 2017-10-31 NOTE — PROGRESS NOTE ADULT - PROBLEM SELECTOR PLAN 1
Asthma exacerbation vs Viral bronchitis  - f/u RVP  - duonebs  - Prednisone 40mg qday x 5 days  - benzonatate  - guaifenesin  - Unlikely cardiac- patient said she had outpatient workup done recently which was normal. BNP- wnl. EKG- NSR, T1- negative. Denies any chest pain or tightness Asthma exacerbation vs Viral bronchitis  - f/u RVP  - duonebs  - Prednisone 40mg qday x 5 days  - benzonatate  - guaifenesin  - Unlikely cardiac- patient said she had outpatient workup done recently which was normal. BNP- wnl. EKG- NSR, T1- negative. Denies any chest pain or tightness  -Pulm: Dr. Adam

## 2017-10-31 NOTE — PROGRESS NOTE ADULT - SUBJECTIVE AND OBJECTIVE BOX
PGY 1 Note discussed with supervising resident and primary attending    Patient is a 82y old  Female who presents with a chief complaint of cough (29 Oct 2017 19:36)      INTERVAL HPI/OVERNIGHT EVENTS: no new complaints    MEDICATIONS  (STANDING):  ALBUTerol/ipratropium for Nebulization 3 milliLiter(s) Nebulizer every 6 hours  aspirin  chewable 81 milliGRAM(s) Oral daily  atorvastatin 40 milliGRAM(s) Oral at bedtime  benzonatate 100 milliGRAM(s) Oral daily  enoxaparin Injectable 40 milliGRAM(s) SubCutaneous daily  fluticasone propionate 50 MICROgram(s)/spray Nasal Spray 1 Spray(s) Both Nostrils two times a day  guaiFENesin    Syrup 200 milliGRAM(s) Oral every 6 hours  letrozole 2.5 milliGRAM(s) Oral daily  mesalamine DR Capsule 800 milliGRAM(s) Oral two times a day  pantoprazole    Tablet 40 milliGRAM(s) Oral before breakfast  predniSONE   Tablet 40 milliGRAM(s) Oral daily  tiotropium 18 MICROgram(s) Capsule 1 Capsule(s) Inhalation daily    MEDICATIONS  (PRN):      __________________________________________________  REVIEW OF SYSTEMS:    CONSTITUTIONAL: No fever,   EYES: no acute visual disturbances  NECK: No pain or stiffness  RESPIRATORY: No cough; No shortness of breath  CARDIOVASCULAR: No chest pain, no palpitations  GASTROINTESTINAL: No pain. No nausea or vomiting; No diarrhea   NEUROLOGICAL: No headache or numbness, no tremors  MUSCULOSKELETAL: No joint pain, no muscle pain  GENITOURINARY: no dysuria, no frequency, no hesitancy  PSYCHIATRY: no depression , no anxiety  ALL OTHER  ROS negative        Vital Signs Last 24 Hrs  T(C): 36.9 (31 Oct 2017 05:23), Max: 36.9 (31 Oct 2017 05:23)  T(F): 98.5 (31 Oct 2017 05:23), Max: 98.5 (31 Oct 2017 05:23)  HR: 60 (31 Oct 2017 05:23) (60 - 96)  BP: 112/78 (31 Oct 2017 05:23) (99/50 - 121/69)  BP(mean): --  RR: 18 (31 Oct 2017 05:23) (16 - 22)  SpO2: 96% (31 Oct 2017 05:23) (92% - 97%)    ________________________________________________  PHYSICAL EXAM:  GENERAL: NAD  HEENT:Normocephalic;  conjunctivae and sclerae clear; moist mucous membranes;   NECK : supple  CHEST/LUNG: Clear to auscuitation bilaterally with good air entry   HEART: S1 S2  regular; no murmurs, gallops or rubs  ABDOMEN: Soft, Nontender, Nondistended; Bowel sounds present  EXTREMITIES: no cyanosis; no edema; no calf tenderness  NERVOUS SYSTEM:  Awake and alert; Oriented  to place, person and time ; no new deficits    _________________________________________________  LABS:                        14.5   8.3   )-----------( 199      ( 30 Oct 2017 06:27 )             45.2     10-30    137  |  104  |  19<H>  ----------------------------<  137<H>  4.2   |  25  |  0.79    Ca    9.9      30 Oct 2017 06:27      PT/INR - ( 29 Oct 2017 15:29 )   PT: 10.8 sec;   INR: 0.99 ratio         PTT - ( 29 Oct 2017 15:29 )  PTT:22.3 sec  Urinalysis Basic - ( 29 Oct 2017 23:21 )    Color: Yellow / Appearance: Clear / S.050 / pH: x  Gluc: x / Ketone: Negative  / Bili: Negative / Urobili: Negative   Blood: x / Protein: Negative / Nitrite: Negative   Leuk Esterase: Negative / RBC: x / WBC x   Sq Epi: x / Non Sq Epi: x / Bacteria: x      CAPILLARY BLOOD GLUCOSE      POCT Blood Glucose.: 160 mg/dL (30 Oct 2017 16:59)        RADIOLOGY & ADDITIONAL TESTS:    Imaging Personally Reviewed:  YES/NO    Consultant(s) Notes Reviewed:   YES/ No    Care Discussed with Consultants :     Plan of care was discussed with patient and /or primary care giver; all questions and concerns were addressed and care was aligned with patient's wishes. PGY 1 Note discussed with supervising resident and primary attending    Patient is a 82y old  Female who presents with a chief complaint of cough (29 Oct 2017 19:36)    INTERVAL HPI/OVERNIGHT EVENTS:  No acute events reported overnight.    Today pt presents in no acute distress.  Pt found resting comfortably in bed.  Pt states Cough is subsiding, and denies SOB.  Currently offers no new complaints today.       MEDICATIONS  (STANDING):  ALBUTerol/ipratropium for Nebulization 3 milliLiter(s) Nebulizer every 6 hours  aspirin  chewable 81 milliGRAM(s) Oral daily  atorvastatin 40 milliGRAM(s) Oral at bedtime  benzonatate 100 milliGRAM(s) Oral daily  enoxaparin Injectable 40 milliGRAM(s) SubCutaneous daily  fluticasone propionate 50 MICROgram(s)/spray Nasal Spray 1 Spray(s) Both Nostrils two times a day  guaiFENesin    Syrup 200 milliGRAM(s) Oral every 6 hours  letrozole 2.5 milliGRAM(s) Oral daily  mesalamine DR Capsule 800 milliGRAM(s) Oral two times a day  pantoprazole    Tablet 40 milliGRAM(s) Oral before breakfast  predniSONE   Tablet 40 milliGRAM(s) Oral daily  tiotropium 18 MICROgram(s) Capsule 1 Capsule(s) Inhalation daily  __________________________________________________  REVIEW OF SYSTEMS:    CONSTITUTIONAL: No fever,   EYES: no acute visual disturbances  NECK: No pain or stiffness  RESPIRATORY: mild cough; No shortness of breath  CARDIOVASCULAR: No chest pain, no palpitations  GASTROINTESTINAL: No pain. No nausea or vomiting; No diarrhea   NEUROLOGICAL: No headache or numbness, no tremors  MUSCULOSKELETAL: No joint pain, no muscle pain  GENITOURINARY: no dysuria, no frequency, no hesitancy  PSYCHIATRY: no depression , no anxiety  ALL OTHER  ROS negative        Vital Signs Last 24 Hrs  T(C): 36.9 (31 Oct 2017 05:23), Max: 36.9 (31 Oct 2017 05:23)  T(F): 98.5 (31 Oct 2017 05:23), Max: 98.5 (31 Oct 2017 05:23)  HR: 60 (31 Oct 2017 05:23) (60 - 96)  BP: 112/78 (31 Oct 2017 05:23) (99/50 - 121/69)  RR: 18 (31 Oct 2017 05:23) (16 - 22)  SpO2: 96% (31 Oct 2017 05:23) (92% - 97%)    ________________________________________________  PHYSICAL EXAM:  GENERAL: NAD  HEENT: Normocephalic;  conjunctivae and sclerae clear; moist mucous membranes;   NECK : supple  CHEST/LUNG: Clear to auscultation bilaterally with good air entry   HEART: S1 S2  regular; no murmurs, gallops or rubs  ABDOMEN: Soft, Nontender, Nondistended; Bowel sounds present  EXTREMITIES: no cyanosis; no edema; no calf tenderness  NERVOUS SYSTEM:  Awake and alert; Oriented  to place, person and time ; no new deficits    _________________________________________________  LABS:                                   14.9   9.0   )-----------( 198      ( 31 Oct 2017 08:13 )             45.3     10-    138  |  102  |  20<H>  ----------------------------<  96  4.2   |  29  |  0.82    Ca    9.5      31 Oct 2017 08:13           PTT - ( 29 Oct 2017 15:29 )  PTT:22.3 sec  Urinalysis Basic - ( 29 Oct 2017 23:21 )    Color: Yellow / Appearance: Clear / S.050 / pH: x  Gluc: x / Ketone: Negative  / Bili: Negative / Urobili: Negative   Blood: x / Protein: Negative / Nitrite: Negative   Leuk Esterase: Negative / RBC: x / WBC x   Sq Epi: x / Non Sq Epi: x / Bacteria: x      CAPILLARY BLOOD GLUCOSE      POCT Blood Glucose.: 160 mg/dL (30 Oct 2017 16:59)        RADIOLOGY & ADDITIONAL TESTS:   Xray Chest 2 Views PA/Lat (10.29.17 @ 15:55) >  FINDINGS: The lungs are clear. Costophrenic angles are sharp. The   cardiomediastinal silhouette is within normal limits of size.   Degenerative changes of the spine are present.    IMPRESSION: Clear lungs.     Imaging Personally Reviewed:  YES    Consultant(s) Notes Reviewed:   YES    Care Discussed with Consultants :     Plan of care was discussed with patient and /or primary care giver; all questions and concerns were addressed and care was aligned with patient's wishes.

## 2017-11-01 ENCOUNTER — TRANSCRIPTION ENCOUNTER (OUTPATIENT)
Age: 82
End: 2017-11-01

## 2017-11-01 VITALS
DIASTOLIC BLOOD PRESSURE: 60 MMHG | SYSTOLIC BLOOD PRESSURE: 118 MMHG | RESPIRATION RATE: 18 BRPM | HEART RATE: 74 BPM | OXYGEN SATURATION: 95 % | TEMPERATURE: 98 F

## 2017-11-01 LAB
ANION GAP SERPL CALC-SCNC: 4 MMOL/L — LOW (ref 5–17)
BUN SERPL-MCNC: 22 MG/DL — HIGH (ref 7–18)
CALCIUM SERPL-MCNC: 9.1 MG/DL — SIGNIFICANT CHANGE UP (ref 8.4–10.5)
CHLORIDE SERPL-SCNC: 105 MMOL/L — SIGNIFICANT CHANGE UP (ref 96–108)
CO2 SERPL-SCNC: 28 MMOL/L — SIGNIFICANT CHANGE UP (ref 22–31)
CREAT SERPL-MCNC: 0.74 MG/DL — SIGNIFICANT CHANGE UP (ref 0.5–1.3)
GLUCOSE SERPL-MCNC: 99 MG/DL — SIGNIFICANT CHANGE UP (ref 70–99)
HCT VFR BLD CALC: 42.5 % — SIGNIFICANT CHANGE UP (ref 34.5–45)
HGB BLD-MCNC: 14.2 G/DL — SIGNIFICANT CHANGE UP (ref 11.5–15.5)
MAGNESIUM SERPL-MCNC: 2.1 MG/DL — SIGNIFICANT CHANGE UP (ref 1.6–2.6)
MCHC RBC-ENTMCNC: 32.8 PG — SIGNIFICANT CHANGE UP (ref 27–34)
MCHC RBC-ENTMCNC: 33.4 GM/DL — SIGNIFICANT CHANGE UP (ref 32–36)
MCV RBC AUTO: 98.2 FL — SIGNIFICANT CHANGE UP (ref 80–100)
PHOSPHATE SERPL-MCNC: 3.4 MG/DL — SIGNIFICANT CHANGE UP (ref 2.5–4.5)
PLATELET # BLD AUTO: 172 K/UL — SIGNIFICANT CHANGE UP (ref 150–400)
POTASSIUM SERPL-MCNC: 4.1 MMOL/L — SIGNIFICANT CHANGE UP (ref 3.5–5.3)
POTASSIUM SERPL-SCNC: 4.1 MMOL/L — SIGNIFICANT CHANGE UP (ref 3.5–5.3)
RBC # BLD: 4.33 M/UL — SIGNIFICANT CHANGE UP (ref 3.8–5.2)
RBC # FLD: 13.5 % — SIGNIFICANT CHANGE UP (ref 10.3–14.5)
SODIUM SERPL-SCNC: 137 MMOL/L — SIGNIFICANT CHANGE UP (ref 135–145)
WBC # BLD: 7.5 K/UL — SIGNIFICANT CHANGE UP (ref 3.8–10.5)
WBC # FLD AUTO: 7.5 K/UL — SIGNIFICANT CHANGE UP (ref 3.8–10.5)

## 2017-11-01 PROCEDURE — 71010: CPT | Mod: 26

## 2017-11-01 RX ORDER — ARFORMOTEROL TARTRATE 15 UG/2ML
2 SOLUTION RESPIRATORY (INHALATION)
Qty: 60 | Refills: 0
Start: 2017-11-01 | End: 2017-12-01

## 2017-11-01 RX ORDER — FLUTICASONE PROPIONATE 220 MCG
0 AEROSOL WITH ADAPTER (GRAM) INHALATION
Qty: 0 | Refills: 0 | COMMUNITY

## 2017-11-01 RX ORDER — MONTELUKAST 4 MG/1
1 TABLET, CHEWABLE ORAL
Qty: 60 | Refills: 0 | OUTPATIENT
Start: 2017-11-01 | End: 2017-12-01

## 2017-11-01 RX ORDER — BUDESONIDE, MICRONIZED 100 %
2 POWDER (GRAM) MISCELLANEOUS
Qty: 60 | Refills: 0
Start: 2017-11-01 | End: 2017-12-01

## 2017-11-01 RX ORDER — ATORVASTATIN CALCIUM 80 MG/1
1 TABLET, FILM COATED ORAL
Qty: 30 | Refills: 0
Start: 2017-11-01 | End: 2017-12-01

## 2017-11-01 RX ADMIN — LETROZOLE 2.5 MILLIGRAM(S): 2.5 TABLET, FILM COATED ORAL at 13:01

## 2017-11-01 RX ADMIN — PANTOPRAZOLE SODIUM 40 MILLIGRAM(S): 20 TABLET, DELAYED RELEASE ORAL at 06:17

## 2017-11-01 RX ADMIN — Medication 800 MILLIGRAM(S): at 08:20

## 2017-11-01 RX ADMIN — Medication 1 SPRAY(S): at 06:17

## 2017-11-01 RX ADMIN — Medication 40 MILLIGRAM(S): at 06:17

## 2017-11-01 RX ADMIN — Medication 3 MILLILITER(S): at 09:17

## 2017-11-01 RX ADMIN — Medication 81 MILLIGRAM(S): at 13:01

## 2017-11-01 RX ADMIN — Medication 100 MILLIGRAM(S): at 13:01

## 2017-11-01 NOTE — DISCHARGE NOTE ADULT - PLAN OF CARE
Please continue bronchodilators and schedule close follow up with pcp You arrived with shortness of breath and cough likely 2/2 asthma exacerbation from viral illness.  Please continue cough suppression medication, and inhaler therapy as needed.  Chest x-ray was clear, you remained afebrile, and you did not have an elev WBC count.  Please schedule follow up with pulmonologist, Dr. Adam, within 1 week of discharge.  Please schedule follow up with PCP within 1 week. You remained asymptomatic during stay, please continue current management Please continue current management and schedule follow up with PCP within 1 week of dscharge You arrived with shortness of breath and cough likely 2/2 asthma exacerbation from viral illness.  Please continue cough suppression medication, and inhaler therapy as needed.  Chest x-ray sig for chronic calcified granulomas, you remained afebrile, and you did not have an elev WBC count.  Please schedule follow up with pulmonologist, Dr. Adam, within 1 week of discharge.  Please schedule follow up with PCP within 1 week.

## 2017-11-01 NOTE — CONSULT NOTE ADULT - SUBJECTIVE AND OBJECTIVE BOX
PULMONARY CONSULT NOTE      ANTWON ROLON  MRN-[id number]    Patient is a 82y old  Female who presents with a chief complaint of cough (01 Nov 2017 07:42) asso with sob and phlegm x 1 week, feels better today   Hx chart lab and  xrays reviewed      HISTORY OF PRESENT ILLNESS:83yo F with h/o Crohns disease, asthma, breast cancer s/p mastectomy (1988, and 2015), arthritis, and GERD came in with c/o nonproductive cough, wheezing, and SOB since the past couple of days. She said she has SOB at rest and exertion. Denies chest pain or tightness,. She said she has been using her albuterol inhaler but the cough has been persistent. Denies any recent use of steroids or antibiotics. Denies fevers, chills, nausea, or vomiting.  Patient does not remember her medication list. She said she was at friend's home who recommended her to come to ER due to persistent cough and hoarse voice. She also does not remember the name of her pharmacy. She said she walks independently and is active at baseline. She lives by herself and  does the household work. Also drives a car and does her grocery. Denies smoking, alcohol or any other drug use. 83yo F with h/o Crohns disease, asthma, breast cancer s/p mastectomy (1988, and 2015), arthritis, and GERD came in with c/o nonproductive cough, wheezing, and SOB since the past couple of days. She said she has SOB at rest and exertion. Denies chest pain or tightness,. She said she has been using her albuterol inhaler but the cough has been persistent. Denies any recent use of steroids or antibiotics. Denies fevers, chills, nausea, or vomiting.  Patient does not remember her medication list. She said she was at friend's home who recommended her to come to ER due to persistent cough and hoarse voice. She also does not remember the name of her pharmacy. She said she walks independently and is active at baseline. She lives by herself and  does the household work. Also drives a car and does her grocery. Denies smoking, alcohol or any other drug use.     Home Medications:  fluticasone propionate:  (30 Oct 2017 12:02)  omeprazole:  (30 Oct 2017 12:05)  predniSONE 20 mg oral tablet: 2 tab(s) orally once a day (01 Nov 2017 08:13)  Spiriva:  (30 Oct 2017 12:02)      MEDICATIONS  (STANDING):  ALBUTerol/ipratropium for Nebulization 3 milliLiter(s) Nebulizer every 6 hours  aspirin  chewable 81 milliGRAM(s) Oral daily  atorvastatin 40 milliGRAM(s) Oral at bedtime  benzonatate 100 milliGRAM(s) Oral daily  docusate sodium 100 milliGRAM(s) Oral daily  enoxaparin Injectable 40 milliGRAM(s) SubCutaneous daily  fluticasone propionate 50 MICROgram(s)/spray Nasal Spray 1 Spray(s) Both Nostrils two times a day  guaiFENesin    Syrup 200 milliGRAM(s) Oral every 6 hours  letrozole 2.5 milliGRAM(s) Oral daily  mesalamine DR Capsule 800 milliGRAM(s) Oral two times a day  pantoprazole    Tablet 40 milliGRAM(s) Oral before breakfast  predniSONE   Tablet 40 milliGRAM(s) Oral daily  senna 2 Tablet(s) Oral at bedtime  tiotropium 18 MICROgram(s) Capsule 1 Capsule(s) Inhalation daily    Allergies    penicillin (Unknown)          PAST MEDICAL & SURGICAL HISTORY:  Asthma, crohn disease ,   Mastectomy 1988 and 2015  No significant past surgical history      FAMILY HISTORY: no htn or DM      SOCIAL HISTORY SMOKING-    ETOH --    DRUGS--  Retd 1997, 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 No nausea, vomiting, or hematemesis; No diarrhea or constipation. No melena or hematochezia.  GENITOURINARY: No dysuria, frequency, hematuria, or incontinence  NEUROLOGICAL: No headaches, memory loss, loss of strength, numbness, or tremors  SKIN: No itching, burning, rashes, or lesions   LYMPH Nodes: No enlarged glands  ENDOCRINE: No heat or cold intolerance; No hair loss  MUSCULOSKELETAL: No joint pain or swelling; No muscle, back, or extremity pain  PSYCHIATRIC: No depression, anxiety, mood swings, or difficulty sleeping  HEME/LYMPH: No easy bruising, or bleeding gums  ALLERGY AND IMMUNOLOGIC: No hives or eczema      Vital Signs Last 24 Hrs  T(C): 36.9 (01 Nov 2017 05:24), Max: 36.9 (01 Nov 2017 05:24)  T(F): 98.5 (01 Nov 2017 05:24), Max: 98.5 (01 Nov 2017 05:24)  HR: 74 (01 Nov 2017 05:24) (74 - 95)  BP: 118/60 (01 Nov 2017 05:24) (100/52 - 133/77)  BP(mean): --  RR: 18 (01 Nov 2017 05:24) (16 - 18)  SpO2: 95% (01 Nov 2017 05:24) (95% - 99%)  I&O's Detail      PHYSICAL EXAMINATION:    GENERAL: The patient is a well-developed, well-nourished in no apparent distress.   SKIN: No rashes ecchymoses or cyanosis  HEENT: Head is normocephalic and atraumatic. Extraocular muscles are intact. Mucous membranes are moist.   Neck supple LN not felt, JVP not increased  Thyroid not enlarged  Lymphatic: No lymphadenopathy  Cardiovascular:  S1 S2  heard ,RSR , JVP not increased , syst murmur at apex, No  gallop or rub  Respiratory:  Symmetrical chest wall movements Breathing vesicular , Percussion note normal no dulness   with  occasional rhonchi, No rales or wheeze  ABDOMEN:  Soft, Non-tender,   No  hepatosplenomegaly ,BS positive		  Extremities: Normal range of motion, No clubbing, cyanosis or edema , No calf tenderness  Vascular: Peripheral pulses palpable 2+ bilaterally  CNS:Alert and oriented x3,  Mood and affect appropriate  Cranial nerves intact  sensory intact  motor Power5/5, DTR 2+   Babinski neg    LABS:                        14.2   7.5   )-----------( 172      ( 01 Nov 2017 07:28 )             42.5     11-01    137  |  105  |  22<H>  ----------------------------<  99  4.1   |  28  |  0.74    Ca    9.1      01 Nov 2017 07:28  Phos  3.4     11-01  Mg     2.1     11-01    Serum Pro-Brain Natriuretic Peptide: 235 pg/mL (10-29-17 @ 15:28)    MICROBIOLOGY:    Culture - Blood (collected 10-29-17 @ 22:46)  Source: .Blood Blood-Peripheral  Preliminary Report (10-30-17 @ 23:01):    No growth to date.    Culture - Blood (collected 10-29-17 @ 22:46)  Source: .Blood Blood-Peripheral  Preliminary Report (10-30-17 @ 23:01):    No growth to date.        RADIOLOGY & ADDITIONAL STUDIES:    CXR: clear l;ungs    EKG: Nsr ,Rad, pvc

## 2017-11-01 NOTE — DISCHARGE NOTE ADULT - CARE PLAN
Principal Discharge DX:	Asthma  Goal:	Please continue bronchodilators and schedule close follow up with pcp  Instructions for follow-up, activity and diet:	You arrived with shortness of breath and cough likely 2/2 asthma exacerbation from viral illness.  Please continue cough suppression medication, and inhaler therapy as needed.  Chest x-ray was clear, you remained afebrile, and you did not have an elev WBC count.  Please schedule follow up with pulmonologist, Dr. Adam, within 1 week of discharge.  Please schedule follow up with PCP within 1 week.  Secondary Diagnosis:	Crohn disease  Goal:	You remained asymptomatic during stay, please continue current management  Instructions for follow-up, activity and diet:	Please continue current management and schedule follow up with PCP within 1 week of dscharge Principal Discharge DX:	Asthma  Goal:	Please continue bronchodilators and schedule close follow up with pcp  Instructions for follow-up, activity and diet:	You arrived with shortness of breath and cough likely 2/2 asthma exacerbation from viral illness.  Please continue cough suppression medication, and inhaler therapy as needed.  Chest x-ray sig for chronic calcified granulomas, you remained afebrile, and you did not have an elev WBC count.  Please schedule follow up with pulmonologist, Dr. Adam, within 1 week of discharge.  Please schedule follow up with PCP within 1 week.  Secondary Diagnosis:	Crohn disease  Goal:	You remained asymptomatic during stay, please continue current management  Instructions for follow-up, activity and diet:	Please continue current management and schedule follow up with PCP within 1 week of dscharge

## 2017-11-01 NOTE — CONSULT NOTE ADULT - ASSESSMENT
Asthmatic Bronchitis  Crohn Disease  Mastectomy      Plan:  pulmicort 1mg/2 ml by nebuliser BID plus Brovana 15 mg BID as pt cant tolerate inhalers  Po prednisone 40 mg daily x 4 days ,20 mg daily x 4 days and d/c  singulair 10 mg HS, Pft as out Pt  Thanks for consult

## 2017-11-01 NOTE — DISCHARGE NOTE ADULT - PRINCIPAL DIAGNOSIS
"Read more about your child's intermittent exotropia online at: http://www.aapos.org/terms. Dr. Pearson is a member of the American Association for Pediatric Ophthalmology and Strabismus, an international organization of physicians (doctors with an \"MD\" degree) with specialized training and experience in providing state-of-the-art medical and surgical eye care for children.     For a free and informative book on strabismus (eye misalignment disorders), go to:  http://Cavium.Squirrly/eyemusclebook  "
Asthma

## 2017-11-01 NOTE — DISCHARGE NOTE ADULT - CARE PROVIDER_API CALL
Derrek Adam (WILIAM), Internal Medicine; Pulmonary Disease  [address] [place] [address]  Phone: [phone]  Fax: [phone]

## 2017-11-01 NOTE — DISCHARGE NOTE ADULT - MEDICATION SUMMARY - MEDICATIONS TO TAKE
I will START or STAY ON the medications listed below when I get home from the hospital:    predniSONE 20 mg oral tablet  -- 2 tab(s) by mouth once a day x 4 days  1 tab(s) by mouth once a day x 4 days  -- Indication: For Asthma    Pulmicort Respules 1 mg/2 mL inhalation suspension  -- 2 milliliter(s) inhaled 2 times a day   -- For inhalation only.  Rinse mouth thoroughly after use.  Shake well before use.    -- Indication: For Asthma    atorvastatin 40 mg oral tablet  -- 1 tab(s) by mouth once a day (at bedtime)  -- Indication: For Need for prophylactic measure    benzonatate 100 mg oral capsule  -- 1 cap(s) by mouth once a day  -- Indication: For Asthma    Spiriva  -- Indication: For Asthma    Brovana 15 mcg/2 mL inhalation solution  -- 2 milliliter(s) inhaled 2 times a day   -- Check with your doctor before becoming pregnant.  For inhalation only.  Keep in refrigerator.  Do not freeze.    -- Indication: For Asthma    Singulair 10 mg oral tablet  -- 1 tab(s) by mouth 2 times a day   -- It is very important that you take or use this exactly as directed.  Do not skip doses or discontinue unless directed by your doctor.    -- Indication: For Asthma    omeprazole  -- Indication: For Cough

## 2017-11-01 NOTE — DISCHARGE NOTE ADULT - HOSPITAL COURSE
Pt is an 81yo F with h/o Crohn's disease, asthma, breast cancer s/p mastectomy (1988, and 2015), arthritis, and GERD who presented with nonproductive cough, wheezing, and SOB x 2 days. Pt reports SOB at rest and exertion. Denies chest pain or tightness,. She said she has been using her albuterol inhaler but the cough has been persistent. Denies any recent use of steroids or antibiotics. Denies fevers, chills, nausea, or vomiting.    Patient does not remember her medication list. She said she was at friend's home who recommended her to come to ER due to persistent cough and hoarse voice. She also does not remember the name of her pharmacy. She said she walks independently and is active at baseline. She lives by herself and performs household work. Also drives a car and does her grocery. Denies smoking, alcohol or any other drug use.     Unlikely cardiac- patient said she had outpatient workup done recently which was normal. BNP- wnl. EKG- NSR, T1- negative. Denies any chest pain or tightness. Pt admitted for acute asthma exacerbation 2/2 viral infection.  Pt remained afebrile without leukocytosis.  Cxr was clear, and pt was treated with cough suppressive therapy.  Pt was evaluated by pulm, Dr. Adam, who stated    Pt currently medically stable and ready for discharge with instruction to schedule close follow up with PCP and pulmonologist within 1 week of discharge.  Pt will also continue prednisone taper as OP. Pt is an 83yo F with h/o Crohn's disease, asthma, breast cancer s/p mastectomy (1988, and 2015), arthritis, and GERD who presented with nonproductive cough, wheezing, and SOB x 2 days. Pt reports SOB at rest and exertion. Denies chest pain or tightness,. She said she has been using her albuterol inhaler but the cough has been persistent. Denies any recent use of steroids or antibiotics. Denies fevers, chills, nausea, or vomiting.    Patient does not remember her medication list. She said she was at friend's home who recommended her to come to ER due to persistent cough and hoarse voice. She also does not remember the name of her pharmacy. She said she walks independently and is active at baseline. She lives by herself and performs household work. Also drives a car and does her grocery. Denies smoking, alcohol or any other drug use.     Unlikely cardiac- patient said she had outpatient workup done recently which was normal. BNP- wnl. EKG- NSR, T1- negative. Denies any chest pain or tightness. Pt admitted for acute asthma exacerbation 2/2 viral infection.  Pt remained afebrile without leukocytosis.  Cxr sig for chronic granulomas- pt currently undergoing OP workup.  Pt was treated with cough suppressive therapy.  Pt was evaluated by pulm, Dr. Adam, who stated pt was ok for discharge on Pulmicort, Brovana and Prednisone taper.  Pt will follow up with Dr. Adam within 1 week of discharge.    Pt currently medically stable and ready for discharge with instruction to schedule close follow up with PCP and pulmonologist within 1 week of discharge.  Pt will also continue prednisone taper as OP.

## 2017-11-03 LAB
CULTURE RESULTS: SIGNIFICANT CHANGE UP
CULTURE RESULTS: SIGNIFICANT CHANGE UP
SPECIMEN SOURCE: SIGNIFICANT CHANGE UP
SPECIMEN SOURCE: SIGNIFICANT CHANGE UP

## 2017-12-19 PROCEDURE — 81003 URINALYSIS AUTO W/O SCOPE: CPT

## 2017-12-19 PROCEDURE — 85027 COMPLETE CBC AUTOMATED: CPT

## 2017-12-19 PROCEDURE — 87581 M.PNEUMON DNA AMP PROBE: CPT

## 2017-12-19 PROCEDURE — 85730 THROMBOPLASTIN TIME PARTIAL: CPT

## 2017-12-19 PROCEDURE — 81001 URINALYSIS AUTO W/SCOPE: CPT

## 2017-12-19 PROCEDURE — 85610 PROTHROMBIN TIME: CPT

## 2017-12-19 PROCEDURE — 84484 ASSAY OF TROPONIN QUANT: CPT

## 2017-12-19 PROCEDURE — 82550 ASSAY OF CK (CPK): CPT

## 2017-12-19 PROCEDURE — 87633 RESP VIRUS 12-25 TARGETS: CPT

## 2017-12-19 PROCEDURE — 84100 ASSAY OF PHOSPHORUS: CPT

## 2017-12-19 PROCEDURE — 83735 ASSAY OF MAGNESIUM: CPT

## 2017-12-19 PROCEDURE — 94640 AIRWAY INHALATION TREATMENT: CPT

## 2017-12-19 PROCEDURE — 71045 X-RAY EXAM CHEST 1 VIEW: CPT

## 2017-12-19 PROCEDURE — 82553 CREATINE MB FRACTION: CPT

## 2017-12-19 PROCEDURE — 99285 EMERGENCY DEPT VISIT HI MDM: CPT | Mod: 25

## 2017-12-19 PROCEDURE — 87040 BLOOD CULTURE FOR BACTERIA: CPT

## 2017-12-19 PROCEDURE — 93005 ELECTROCARDIOGRAM TRACING: CPT

## 2017-12-19 PROCEDURE — 87486 CHLMYD PNEUM DNA AMP PROBE: CPT

## 2017-12-19 PROCEDURE — 96374 THER/PROPH/DIAG INJ IV PUSH: CPT

## 2017-12-19 PROCEDURE — 83880 ASSAY OF NATRIURETIC PEPTIDE: CPT

## 2017-12-19 PROCEDURE — 82962 GLUCOSE BLOOD TEST: CPT

## 2017-12-19 PROCEDURE — 71046 X-RAY EXAM CHEST 2 VIEWS: CPT

## 2017-12-19 PROCEDURE — 80048 BASIC METABOLIC PNL TOTAL CA: CPT

## 2017-12-19 PROCEDURE — 87798 DETECT AGENT NOS DNA AMP: CPT

## 2017-12-19 PROCEDURE — 80061 LIPID PANEL: CPT

## 2018-05-30 ENCOUNTER — APPOINTMENT (OUTPATIENT)
Dept: CARDIOLOGY | Facility: CLINIC | Age: 83
End: 2018-05-30
Payer: MEDICARE

## 2018-05-30 VITALS
BODY MASS INDEX: 30.05 KG/M2 | SYSTOLIC BLOOD PRESSURE: 128 MMHG | WEIGHT: 176 LBS | DIASTOLIC BLOOD PRESSURE: 67 MMHG | HEART RATE: 86 BPM | HEIGHT: 64 IN | OXYGEN SATURATION: 96 %

## 2018-05-30 DIAGNOSIS — Z87.39 PERSONAL HISTORY OF OTHER DISEASES OF THE MUSCULOSKELETAL SYSTEM AND CONNECTIVE TISSUE: ICD-10-CM

## 2018-05-30 DIAGNOSIS — Z85.3 PERSONAL HISTORY OF MALIGNANT NEOPLASM OF BREAST: ICD-10-CM

## 2018-05-30 DIAGNOSIS — Z00.00 ENCOUNTER FOR GENERAL ADULT MEDICAL EXAMINATION W/OUT ABNORMAL FINDINGS: ICD-10-CM

## 2018-05-30 PROCEDURE — 99204 OFFICE O/P NEW MOD 45 MIN: CPT

## 2018-05-30 PROCEDURE — 93000 ELECTROCARDIOGRAM COMPLETE: CPT

## 2018-05-30 RX ORDER — PREDNISONE 20 MG/1
20 TABLET ORAL
Refills: 0 | Status: ACTIVE | COMMUNITY

## 2018-05-30 RX ORDER — LETROZOLE TABLETS 2.5 MG/1
TABLET, FILM COATED ORAL
Refills: 0 | Status: ACTIVE | COMMUNITY

## 2018-07-31 ENCOUNTER — APPOINTMENT (OUTPATIENT)
Dept: RHEUMATOLOGY | Facility: CLINIC | Age: 83
End: 2018-07-31

## 2018-09-04 ENCOUNTER — APPOINTMENT (OUTPATIENT)
Dept: CARDIOLOGY | Facility: CLINIC | Age: 83
End: 2018-09-04
Payer: MEDICARE

## 2018-09-04 VITALS
OXYGEN SATURATION: 93 % | SYSTOLIC BLOOD PRESSURE: 122 MMHG | HEART RATE: 69 BPM | HEIGHT: 64 IN | TEMPERATURE: 98.4 F | BODY MASS INDEX: 30.22 KG/M2 | WEIGHT: 177 LBS | DIASTOLIC BLOOD PRESSURE: 70 MMHG

## 2018-09-04 PROBLEM — J45.909 UNSPECIFIED ASTHMA, UNCOMPLICATED: Chronic | Status: ACTIVE | Noted: 2017-10-29

## 2018-09-04 PROCEDURE — 93000 ELECTROCARDIOGRAM COMPLETE: CPT

## 2018-09-04 PROCEDURE — 99214 OFFICE O/P EST MOD 30 MIN: CPT

## 2018-10-04 ENCOUNTER — APPOINTMENT (OUTPATIENT)
Dept: GASTROENTEROLOGY | Facility: CLINIC | Age: 83
End: 2018-10-04
Payer: MEDICARE

## 2018-10-04 VITALS
BODY MASS INDEX: 30.22 KG/M2 | SYSTOLIC BLOOD PRESSURE: 120 MMHG | WEIGHT: 177 LBS | DIASTOLIC BLOOD PRESSURE: 80 MMHG | OXYGEN SATURATION: 96 % | HEART RATE: 68 BPM | TEMPERATURE: 98 F | HEIGHT: 64 IN

## 2018-10-04 DIAGNOSIS — K50.90 CROHN'S DISEASE, UNSPECIFIED, W/OUT COMPLICATIONS: ICD-10-CM

## 2018-10-04 PROCEDURE — 99204 OFFICE O/P NEW MOD 45 MIN: CPT

## 2018-10-04 RX ORDER — MESALAMINE 400 MG/1
400 CAPSULE, DELAYED RELEASE ORAL
Qty: 180 | Refills: 3 | Status: ACTIVE | COMMUNITY
Start: 2018-10-04 | End: 1900-01-01

## 2018-10-04 RX ORDER — OMEPRAZOLE 20 MG/1
20 CAPSULE, DELAYED RELEASE ORAL
Refills: 0 | Status: DISCONTINUED | COMMUNITY
End: 2018-10-04

## 2018-11-01 ENCOUNTER — APPOINTMENT (OUTPATIENT)
Dept: GASTROENTEROLOGY | Facility: CLINIC | Age: 83
End: 2018-11-01

## 2018-12-20 ENCOUNTER — EMERGENCY (EMERGENCY)
Facility: HOSPITAL | Age: 83
LOS: 1 days | Discharge: HOME | End: 2018-12-20
Attending: EMERGENCY MEDICINE
Payer: MEDICARE

## 2018-12-20 VITALS
HEIGHT: 66 IN | RESPIRATION RATE: 18 BRPM | TEMPERATURE: 98 F | HEART RATE: 94 BPM | DIASTOLIC BLOOD PRESSURE: 82 MMHG | SYSTOLIC BLOOD PRESSURE: 121 MMHG | OXYGEN SATURATION: 95 % | WEIGHT: 171.96 LBS

## 2018-12-20 PROCEDURE — 99284 EMERGENCY DEPT VISIT MOD MDM: CPT

## 2018-12-21 VITALS
HEART RATE: 76 BPM | DIASTOLIC BLOOD PRESSURE: 56 MMHG | SYSTOLIC BLOOD PRESSURE: 111 MMHG | OXYGEN SATURATION: 96 % | RESPIRATION RATE: 17 BRPM | TEMPERATURE: 98 F

## 2018-12-21 LAB
ALBUMIN SERPL ELPH-MCNC: 3.2 G/DL — LOW (ref 3.5–5)
ALP SERPL-CCNC: 61 U/L — SIGNIFICANT CHANGE UP (ref 40–120)
ALT FLD-CCNC: 24 U/L DA — SIGNIFICANT CHANGE UP (ref 10–60)
ANION GAP SERPL CALC-SCNC: 10 MMOL/L — SIGNIFICANT CHANGE UP (ref 5–17)
APPEARANCE UR: CLEAR — SIGNIFICANT CHANGE UP
AST SERPL-CCNC: 26 U/L — SIGNIFICANT CHANGE UP (ref 10–40)
BASOPHILS # BLD AUTO: 0.1 K/UL — SIGNIFICANT CHANGE UP (ref 0–0.2)
BASOPHILS NFR BLD AUTO: 1.1 % — SIGNIFICANT CHANGE UP (ref 0–2)
BILIRUB SERPL-MCNC: 0.3 MG/DL — SIGNIFICANT CHANGE UP (ref 0.2–1.2)
BILIRUB UR-MCNC: NEGATIVE — SIGNIFICANT CHANGE UP
BUN SERPL-MCNC: 22 MG/DL — HIGH (ref 7–18)
CALCIUM SERPL-MCNC: 8.6 MG/DL — SIGNIFICANT CHANGE UP (ref 8.4–10.5)
CHLORIDE SERPL-SCNC: 104 MMOL/L — SIGNIFICANT CHANGE UP (ref 96–108)
CO2 SERPL-SCNC: 24 MMOL/L — SIGNIFICANT CHANGE UP (ref 22–31)
COLOR SPEC: YELLOW — SIGNIFICANT CHANGE UP
CREAT SERPL-MCNC: 0.88 MG/DL — SIGNIFICANT CHANGE UP (ref 0.5–1.3)
DIFF PNL FLD: NEGATIVE — SIGNIFICANT CHANGE UP
EOSINOPHIL # BLD AUTO: 0.1 K/UL — SIGNIFICANT CHANGE UP (ref 0–0.5)
EOSINOPHIL NFR BLD AUTO: 1.4 % — SIGNIFICANT CHANGE UP (ref 0–6)
GLUCOSE SERPL-MCNC: 129 MG/DL — HIGH (ref 70–99)
GLUCOSE UR QL: NEGATIVE — SIGNIFICANT CHANGE UP
HCT VFR BLD CALC: 42.2 % — SIGNIFICANT CHANGE UP (ref 34.5–45)
HGB BLD-MCNC: 13.4 G/DL — SIGNIFICANT CHANGE UP (ref 11.5–15.5)
KETONES UR-MCNC: NEGATIVE — SIGNIFICANT CHANGE UP
LEUKOCYTE ESTERASE UR-ACNC: ABNORMAL
LYMPHOCYTES # BLD AUTO: 2.5 K/UL — SIGNIFICANT CHANGE UP (ref 1–3.3)
LYMPHOCYTES # BLD AUTO: 28 % — SIGNIFICANT CHANGE UP (ref 13–44)
MAGNESIUM SERPL-MCNC: 2 MG/DL — SIGNIFICANT CHANGE UP (ref 1.6–2.6)
MCHC RBC-ENTMCNC: 30.9 PG — SIGNIFICANT CHANGE UP (ref 27–34)
MCHC RBC-ENTMCNC: 31.8 GM/DL — LOW (ref 32–36)
MCV RBC AUTO: 97.2 FL — SIGNIFICANT CHANGE UP (ref 80–100)
MONOCYTES # BLD AUTO: 0.7 K/UL — SIGNIFICANT CHANGE UP (ref 0–0.9)
MONOCYTES NFR BLD AUTO: 7.9 % — SIGNIFICANT CHANGE UP (ref 2–14)
NEUTROPHILS # BLD AUTO: 5.5 K/UL — SIGNIFICANT CHANGE UP (ref 1.8–7.4)
NEUTROPHILS NFR BLD AUTO: 61.6 % — SIGNIFICANT CHANGE UP (ref 43–77)
NITRITE UR-MCNC: NEGATIVE — SIGNIFICANT CHANGE UP
PH UR: 5 — SIGNIFICANT CHANGE UP (ref 5–8)
PLATELET # BLD AUTO: 233 K/UL — SIGNIFICANT CHANGE UP (ref 150–400)
POTASSIUM SERPL-MCNC: 4.4 MMOL/L — SIGNIFICANT CHANGE UP (ref 3.5–5.3)
POTASSIUM SERPL-SCNC: 4.4 MMOL/L — SIGNIFICANT CHANGE UP (ref 3.5–5.3)
PROT SERPL-MCNC: 7.4 G/DL — SIGNIFICANT CHANGE UP (ref 6–8.3)
PROT UR-MCNC: NEGATIVE — SIGNIFICANT CHANGE UP
RBC # BLD: 4.34 M/UL — SIGNIFICANT CHANGE UP (ref 3.8–5.2)
RBC # FLD: 13.4 % — SIGNIFICANT CHANGE UP (ref 10.3–14.5)
SODIUM SERPL-SCNC: 138 MMOL/L — SIGNIFICANT CHANGE UP (ref 135–145)
SP GR SPEC: 1.01 — SIGNIFICANT CHANGE UP (ref 1.01–1.02)
TROPONIN I SERPL-MCNC: <0.015 NG/ML — SIGNIFICANT CHANGE UP (ref 0–0.04)
UROBILINOGEN FLD QL: NEGATIVE — SIGNIFICANT CHANGE UP
WBC # BLD: 8.9 K/UL — SIGNIFICANT CHANGE UP (ref 3.8–10.5)
WBC # FLD AUTO: 8.9 K/UL — SIGNIFICANT CHANGE UP (ref 3.8–10.5)

## 2018-12-21 PROCEDURE — 87086 URINE CULTURE/COLONY COUNT: CPT

## 2018-12-21 PROCEDURE — 81001 URINALYSIS AUTO W/SCOPE: CPT

## 2018-12-21 PROCEDURE — 87186 SC STD MICRODIL/AGAR DIL: CPT

## 2018-12-21 PROCEDURE — 83735 ASSAY OF MAGNESIUM: CPT

## 2018-12-21 PROCEDURE — 85027 COMPLETE CBC AUTOMATED: CPT

## 2018-12-21 PROCEDURE — 84484 ASSAY OF TROPONIN QUANT: CPT

## 2018-12-21 PROCEDURE — 36415 COLL VENOUS BLD VENIPUNCTURE: CPT

## 2018-12-21 PROCEDURE — 80053 COMPREHEN METABOLIC PANEL: CPT

## 2018-12-21 PROCEDURE — 93005 ELECTROCARDIOGRAM TRACING: CPT

## 2018-12-21 PROCEDURE — 70450 CT HEAD/BRAIN W/O DYE: CPT | Mod: 26

## 2018-12-21 PROCEDURE — 99284 EMERGENCY DEPT VISIT MOD MDM: CPT | Mod: 25

## 2018-12-21 PROCEDURE — 70450 CT HEAD/BRAIN W/O DYE: CPT

## 2018-12-21 RX ORDER — MECLIZINE HCL 12.5 MG
1 TABLET ORAL
Qty: 21 | Refills: 0
Start: 2018-12-21 | End: 2018-12-27

## 2018-12-21 RX ORDER — ONDANSETRON 8 MG/1
4 TABLET, FILM COATED ORAL ONCE
Qty: 0 | Refills: 0 | Status: COMPLETED | OUTPATIENT
Start: 2018-12-21 | End: 2018-12-21

## 2018-12-21 RX ORDER — MECLIZINE HCL 12.5 MG
50 TABLET ORAL ONCE
Qty: 0 | Refills: 0 | Status: COMPLETED | OUTPATIENT
Start: 2018-12-21 | End: 2018-12-21

## 2018-12-21 RX ADMIN — Medication 0.5 MILLIGRAM(S): at 04:29

## 2018-12-21 RX ADMIN — Medication 50 MILLIGRAM(S): at 01:52

## 2018-12-21 NOTE — ED PROVIDER NOTE - OBJECTIVE STATEMENT
85 y/o F pt with a PMHx of Crohn's disease, asthma, GERD, breast cancer, presents to the ED with complaints of dizziness, more lightheadedness than spinning around 9pm today. Patient notes symptoms worsen with movement and the dizziness is associated with nausea without vomiting. Patient denies headache, visual changes, numbness, tingling, weakness, chest pain, shortness of breath, cough, abdominal complaints, gu complaints, fever, URI symptoms, ear pain or any other complaints. Allergies: Penicillin

## 2018-12-21 NOTE — ED PROVIDER NOTE - CHPI ED SYMPTOMS NEG
no headache, no visual changes, no chest pain, no shortness of breath, no abdominal complaints, no gu complaints, no URI symptoms, no ear pain/no numbness/no vomiting/no fever/no weakness

## 2018-12-21 NOTE — ED ADULT NURSE REASSESSMENT NOTE - NS ED NURSE REASSESS COMMENT FT1
pt continues to c/o vertigo was seen and evaluated by Md Ativan po ordered.
pt i8s discharged to home with prescription all labs were negative.
pt. slept on/off during the night Ativan .5 given for dizziness. all labs negative.
waiting for medical car service to home. as per pt she always took medical service to and from all her appoints including ER.

## 2018-12-21 NOTE — ED PROVIDER NOTE - MEDICAL DECISION MAKING DETAILS
Will do labs, CT head, EKG. give Meclozine, Zofran and reassess. Will do labs, CT head, EKG. give Meclozine, Zofran and reassess.  labs are unremarkable. ct head WNL. ecg unremarkable. iitially given zofran and meclizine with moderate improvement of dizziness. given .5mg ativan and says dizziness has resolved. ambulatory in ed with steady gait. will dc home. f/u PMD. return precautions given.

## 2018-12-21 NOTE — ED PROVIDER NOTE - PROGRESS NOTE DETAILS
states dizziness has improved but still had mild symptoms when she went from bathroom and laid back down. ativan .5mg PO ordered.

## 2018-12-21 NOTE — ED PROVIDER NOTE - EYES, MLM
Clear bilaterally, pupils equal, round and reactive to light. EOMI. Right gaze right nystagmus beating towards the right.

## 2018-12-21 NOTE — ED ADULT NURSE NOTE - NSIMPLEMENTINTERV_GEN_ALL_ED
Implemented All Fall with Harm Risk Interventions:  [place] to call system. Call bell, personal items and telephone within reach. Instruct patient to call for assistance. Room bathroom lighting operational. Non-slip footwear when patient is off stretcher. Physically safe environment: no spills, clutter or unnecessary equipment. Stretcher in lowest position, wheels locked, appropriate side rails in place. Provide visual cue, wrist band, yellow gown, etc. Monitor gait and stability. Monitor for mental status changes and reorient to person, place, and time. Review medications for side effects contributing to fall risk. Reinforce activity limits and safety measures with patient and family. Provide visual clues: red socks.

## 2019-01-08 ENCOUNTER — APPOINTMENT (OUTPATIENT)
Dept: CARDIOLOGY | Facility: CLINIC | Age: 84
End: 2019-01-08
Payer: MEDICARE

## 2019-01-08 VITALS
HEIGHT: 64 IN | DIASTOLIC BLOOD PRESSURE: 74 MMHG | SYSTOLIC BLOOD PRESSURE: 129 MMHG | BODY MASS INDEX: 29.71 KG/M2 | OXYGEN SATURATION: 94 % | TEMPERATURE: 98.5 F | HEART RATE: 94 BPM | WEIGHT: 174 LBS

## 2019-01-08 DIAGNOSIS — H53.9 UNSPECIFIED VISUAL DISTURBANCE: ICD-10-CM

## 2019-01-08 PROBLEM — K50.90 CROHN'S DISEASE, UNSPECIFIED, WITHOUT COMPLICATIONS: Chronic | Status: ACTIVE | Noted: 2018-12-21

## 2019-01-08 PROBLEM — C50.919 MALIGNANT NEOPLASM OF UNSPECIFIED SITE OF UNSPECIFIED FEMALE BREAST: Chronic | Status: ACTIVE | Noted: 2018-12-21

## 2019-01-08 PROBLEM — K21.9 GASTRO-ESOPHAGEAL REFLUX DISEASE WITHOUT ESOPHAGITIS: Chronic | Status: ACTIVE | Noted: 2018-12-21

## 2019-01-08 PROCEDURE — 99212 OFFICE O/P EST SF 10 MIN: CPT

## 2019-01-08 NOTE — ASSESSMENT
[FreeTextEntry1] : 83-year-old female with noted PMH including Crohn's disease, asthma, and BrCA s/p mastectomy, who presents for follow-up visit\par \par 1. Chest pressure: \par -No further episodes\par -Dyspnea is at baseline\par \par 2. Patient requesting referral to ophthalmology due to worsening vision over past few months; this was given\par \par FUV in 6 months or PRN\par \par

## 2019-01-08 NOTE — HISTORY OF PRESENT ILLNESS
[FreeTextEntry1] : 83-year-old female with asthma and arthritis who presents for scheduled follow-up visit. Patient reports she has been doing mostly well since her last visit. Patient developed an episode of vertigo on December 21 for which she went to the emergency room and was given meclizine; she reports the symptoms resolved shortly thereafter. She reports her dyspnea from her asthma has been stable, and unchanged for the past year. She occasionally takes a prednisone tablet when she feels dyspnea which resolves completely. She has had no further episodes of chest discomfort at all since the last time she was here in September. She denies orthopnea, PND, or lower extremity edema. Currently has no complaints. \par \par \par \par \par . Patient still works as a home health aide, she remains very active and also is able to go up a flight of stairs. Patient is able to perform all this activity without any chest pain, dyspnea, palpitations, lightheadedness, or syncope. Patient currently has no symptoms.\par \par Her other medical history is significant for a syncopal episode that occurred after Iv insertion in preparation of stress test. A records request was sent to patient's prior cardiologist, but no records are available in our system.

## 2019-02-07 NOTE — ED ADULT NURSE NOTE - NS ED NURSE REPORT GIVEN TO FT
Melody WELLS Bilateral Helical Rim Advancement Flap Text: The defect edges were debeveled with a #15 blade scalpel.  Given the location of the defect and the proximity to free margins (helical rim) a bilateral helical rim advancement flap was deemed most appropriate.  Using a sterile surgical marker, the appropriate advancement flaps were drawn incorporating the defect and placing the expected incisions between the helical rim and antihelix where possible.  The area thus outlined was incised through and through with a #15 scalpel blade.  With a skin hook and iris scissors, the flaps were gently and sharply undermined and freed up.

## 2019-04-15 NOTE — ED PROVIDER NOTE - INTERPRETATION
Lisinopril 2.5 mg, rx request. Filled per protocol.      Hypertensive Medications  Protocol Criteria:  · Appointment scheduled with Cardiology in the past 12 months or in the next 3 months  · BMP or CMP in the past 12 months  · Potassium: 3.1 - 4.9 mmol/L AGRATIO 1.5 02/12/2016    ANIONGAP 4 03/21/2019    OSMOCALC 289 03/21/2019 abnormal

## 2019-07-09 ENCOUNTER — APPOINTMENT (OUTPATIENT)
Dept: CARDIOLOGY | Facility: CLINIC | Age: 84
End: 2019-07-09

## 2019-09-04 ENCOUNTER — APPOINTMENT (OUTPATIENT)
Dept: CARDIOLOGY | Facility: CLINIC | Age: 84
End: 2019-09-04
Payer: MEDICARE

## 2019-09-04 VITALS
DIASTOLIC BLOOD PRESSURE: 74 MMHG | BODY MASS INDEX: 30.39 KG/M2 | WEIGHT: 178 LBS | TEMPERATURE: 98.3 F | HEART RATE: 100 BPM | SYSTOLIC BLOOD PRESSURE: 116 MMHG | OXYGEN SATURATION: 91 % | HEIGHT: 64 IN

## 2019-09-04 PROCEDURE — 99214 OFFICE O/P EST MOD 30 MIN: CPT

## 2019-09-04 PROCEDURE — 93000 ELECTROCARDIOGRAM COMPLETE: CPT

## 2019-09-04 NOTE — HISTORY OF PRESENT ILLNESS
[FreeTextEntry1] : 84-year-old female with asthma and arthritis who presents for scheduled follow-up visit. Patient reports that people have been smoking marijuana with increasing frequency outside of her apartment, and that it has contributed to worsening dyspnea in light of her asthma. She feels better when she is not exposed to the environment. Patient is scheduled to see her pulmonologist next week for continued titration of her asthma medications. Of note, she is currently on oral steroids.\par \par Patient also complains of occasional chest discomfort, located under the left breast, nonradiating, occurring less than once a month, and not related to physical activity. Symptoms usually resolve after several minutes, often helped by patient taking a baby aspirin. Patient denies orthopnea or PND. She does not complain of lower extremity edema. Is not interested in a stress test given her prior experience.\par \par

## 2019-09-04 NOTE — ASSESSMENT
[FreeTextEntry1] : 84-year-old female with noted PMH including Crohn's disease, asthma, and BrCA s/p mastectomy, who presents for follow-up visit\par \par 1. Chest pressure: \par -Sounds non-cardiac in nature based on patient's description, as well as unremarkable EKG\par -Patient not interested in stress test; she will call office if she develops any worsening symptoms (currently very infrequent)\par \par 2. Dyspnea: Uncertain if this is due to airway irritation in setting of asthma; patient is seeing pulmonologist soon\par -Will send patient for echocardiogram to assess LV and also RV function and PA pressures\par \par 3.HLD: Patient is out of benefit range of statin for primary prevention due to age > 75\par -She has routine blood work followed by her PMD\par \par FUV in 6 months or PRN\par \par

## 2019-09-26 ENCOUNTER — OUTPATIENT (OUTPATIENT)
Dept: OUTPATIENT SERVICES | Facility: HOSPITAL | Age: 84
LOS: 1 days | End: 2019-09-26
Payer: MEDICARE

## 2019-09-26 DIAGNOSIS — R06.00 DYSPNEA, UNSPECIFIED: ICD-10-CM

## 2019-09-26 PROCEDURE — 93306 TTE W/DOPPLER COMPLETE: CPT | Mod: 26

## 2019-09-26 PROCEDURE — 93306 TTE W/DOPPLER COMPLETE: CPT

## 2019-10-24 NOTE — PATIENT PROFILE ADULT. - CAREGIVER PHONE NUMBER
PROBLEM LIST  LABS: Apos/RI BOY    1. Prior PTD at 30w: serial cervical lengths, [place] 16-36w.  2. Elevated BMI: would plan Lovenox 40 mg daily PP in house only. Deliver by 40w.    Doing well, no signs and symptoms of  labor. Occasional low pelvic pressure. Discussed signs and symptoms of PTL and when to call or come in. Tdap today.    Pao Fleming MD     [phone]

## 2019-11-01 ENCOUNTER — APPOINTMENT (OUTPATIENT)
Dept: ORTHOPEDIC SURGERY | Facility: CLINIC | Age: 84
End: 2019-11-01

## 2019-11-04 ENCOUNTER — INPATIENT (INPATIENT)
Facility: HOSPITAL | Age: 84
LOS: 3 days | Discharge: HOME | DRG: 189 | End: 2019-11-08
Attending: HOSPITALIST | Admitting: HOSPITALIST
Payer: MEDICARE

## 2019-11-04 VITALS
HEART RATE: 98 BPM | RESPIRATION RATE: 18 BRPM | DIASTOLIC BLOOD PRESSURE: 85 MMHG | SYSTOLIC BLOOD PRESSURE: 121 MMHG | TEMPERATURE: 98 F | WEIGHT: 173.94 LBS | OXYGEN SATURATION: 95 % | HEIGHT: 65 IN

## 2019-11-04 DIAGNOSIS — J44.1 CHRONIC OBSTRUCTIVE PULMONARY DISEASE WITH (ACUTE) EXACERBATION: ICD-10-CM

## 2019-11-04 LAB
ALBUMIN SERPL ELPH-MCNC: 3.5 G/DL — SIGNIFICANT CHANGE UP (ref 3.5–5)
ALP SERPL-CCNC: 62 U/L — SIGNIFICANT CHANGE UP (ref 40–120)
ALT FLD-CCNC: 33 U/L DA — SIGNIFICANT CHANGE UP (ref 10–60)
ANION GAP SERPL CALC-SCNC: 7 MMOL/L — SIGNIFICANT CHANGE UP (ref 5–17)
AST SERPL-CCNC: 29 U/L — SIGNIFICANT CHANGE UP (ref 10–40)
BASOPHILS # BLD AUTO: 0.02 K/UL — SIGNIFICANT CHANGE UP (ref 0–0.2)
BASOPHILS NFR BLD AUTO: 0.3 % — SIGNIFICANT CHANGE UP (ref 0–2)
BILIRUB SERPL-MCNC: 0.4 MG/DL — SIGNIFICANT CHANGE UP (ref 0.2–1.2)
BUN SERPL-MCNC: 15 MG/DL — SIGNIFICANT CHANGE UP (ref 7–18)
CALCIUM SERPL-MCNC: 9.5 MG/DL — SIGNIFICANT CHANGE UP (ref 8.4–10.5)
CHLORIDE SERPL-SCNC: 104 MMOL/L — SIGNIFICANT CHANGE UP (ref 96–108)
CO2 SERPL-SCNC: 29 MMOL/L — SIGNIFICANT CHANGE UP (ref 22–31)
CREAT SERPL-MCNC: 0.78 MG/DL — SIGNIFICANT CHANGE UP (ref 0.5–1.3)
EOSINOPHIL # BLD AUTO: 0.05 K/UL — SIGNIFICANT CHANGE UP (ref 0–0.5)
EOSINOPHIL NFR BLD AUTO: 0.7 % — SIGNIFICANT CHANGE UP (ref 0–6)
GLUCOSE SERPL-MCNC: 153 MG/DL — HIGH (ref 70–99)
HCT VFR BLD CALC: 42.5 % — SIGNIFICANT CHANGE UP (ref 34.5–45)
HGB BLD-MCNC: 14.1 G/DL — SIGNIFICANT CHANGE UP (ref 11.5–15.5)
IMM GRANULOCYTES NFR BLD AUTO: 0.3 % — SIGNIFICANT CHANGE UP (ref 0–1.5)
LYMPHOCYTES # BLD AUTO: 1.66 K/UL — SIGNIFICANT CHANGE UP (ref 1–3.3)
LYMPHOCYTES # BLD AUTO: 21.8 % — SIGNIFICANT CHANGE UP (ref 13–44)
MCHC RBC-ENTMCNC: 31.7 PG — SIGNIFICANT CHANGE UP (ref 27–34)
MCHC RBC-ENTMCNC: 33.2 GM/DL — SIGNIFICANT CHANGE UP (ref 32–36)
MCV RBC AUTO: 95.5 FL — SIGNIFICANT CHANGE UP (ref 80–100)
MONOCYTES # BLD AUTO: 0.65 K/UL — SIGNIFICANT CHANGE UP (ref 0–0.9)
MONOCYTES NFR BLD AUTO: 8.5 % — SIGNIFICANT CHANGE UP (ref 2–14)
NEUTROPHILS # BLD AUTO: 5.22 K/UL — SIGNIFICANT CHANGE UP (ref 1.8–7.4)
NEUTROPHILS NFR BLD AUTO: 68.4 % — SIGNIFICANT CHANGE UP (ref 43–77)
NRBC # BLD: 0 /100 WBCS — SIGNIFICANT CHANGE UP (ref 0–0)
NT-PROBNP SERPL-SCNC: 156 PG/ML — SIGNIFICANT CHANGE UP (ref 0–450)
PLATELET # BLD AUTO: 242 K/UL — SIGNIFICANT CHANGE UP (ref 150–400)
POTASSIUM SERPL-MCNC: 4 MMOL/L — SIGNIFICANT CHANGE UP (ref 3.5–5.3)
POTASSIUM SERPL-SCNC: 4 MMOL/L — SIGNIFICANT CHANGE UP (ref 3.5–5.3)
PROT SERPL-MCNC: 7.8 G/DL — SIGNIFICANT CHANGE UP (ref 6–8.3)
RBC # BLD: 4.45 M/UL — SIGNIFICANT CHANGE UP (ref 3.8–5.2)
RBC # FLD: 13.5 % — SIGNIFICANT CHANGE UP (ref 10.3–14.5)
SODIUM SERPL-SCNC: 140 MMOL/L — SIGNIFICANT CHANGE UP (ref 135–145)
TROPONIN I SERPL-MCNC: <0.015 NG/ML — SIGNIFICANT CHANGE UP (ref 0–0.04)
WBC # BLD: 7.62 K/UL — SIGNIFICANT CHANGE UP (ref 3.8–10.5)
WBC # FLD AUTO: 7.62 K/UL — SIGNIFICANT CHANGE UP (ref 3.8–10.5)

## 2019-11-04 PROCEDURE — 71046 X-RAY EXAM CHEST 2 VIEWS: CPT | Mod: 26

## 2019-11-04 PROCEDURE — 99223 1ST HOSP IP/OBS HIGH 75: CPT

## 2019-11-04 PROCEDURE — 99285 EMERGENCY DEPT VISIT HI MDM: CPT

## 2019-11-04 PROCEDURE — 93970 EXTREMITY STUDY: CPT | Mod: 26

## 2019-11-04 RX ORDER — MONTELUKAST 4 MG/1
10 TABLET, CHEWABLE ORAL DAILY
Refills: 0 | Status: DISCONTINUED | OUTPATIENT
Start: 2019-11-04 | End: 2019-11-06

## 2019-11-04 RX ORDER — IPRATROPIUM/ALBUTEROL SULFATE 18-103MCG
3 AEROSOL WITH ADAPTER (GRAM) INHALATION EVERY 6 HOURS
Refills: 0 | Status: DISCONTINUED | OUTPATIENT
Start: 2019-11-04 | End: 2019-11-05

## 2019-11-04 RX ORDER — PANTOPRAZOLE SODIUM 20 MG/1
40 TABLET, DELAYED RELEASE ORAL
Refills: 0 | Status: DISCONTINUED | OUTPATIENT
Start: 2019-11-04 | End: 2019-11-05

## 2019-11-04 RX ORDER — IPRATROPIUM/ALBUTEROL SULFATE 18-103MCG
3 AEROSOL WITH ADAPTER (GRAM) INHALATION ONCE
Refills: 0 | Status: COMPLETED | OUTPATIENT
Start: 2019-11-04 | End: 2019-11-04

## 2019-11-04 RX ORDER — TIOTROPIUM BROMIDE 18 UG/1
1 CAPSULE ORAL; RESPIRATORY (INHALATION) DAILY
Refills: 0 | Status: DISCONTINUED | OUTPATIENT
Start: 2019-11-04 | End: 2019-11-05

## 2019-11-04 RX ORDER — ALBUTEROL 90 UG/1
1 AEROSOL, METERED ORAL EVERY 4 HOURS
Refills: 0 | Status: DISCONTINUED | OUTPATIENT
Start: 2019-11-04 | End: 2019-11-08

## 2019-11-04 RX ORDER — BUDESONIDE AND FORMOTEROL FUMARATE DIHYDRATE 160; 4.5 UG/1; UG/1
2 AEROSOL RESPIRATORY (INHALATION)
Refills: 0 | Status: DISCONTINUED | OUTPATIENT
Start: 2019-11-04 | End: 2019-11-08

## 2019-11-04 RX ORDER — ENOXAPARIN SODIUM 100 MG/ML
40 INJECTION SUBCUTANEOUS DAILY
Refills: 0 | Status: DISCONTINUED | OUTPATIENT
Start: 2019-11-04 | End: 2019-11-08

## 2019-11-04 RX ORDER — SODIUM CHLORIDE 9 MG/ML
500 INJECTION INTRAMUSCULAR; INTRAVENOUS; SUBCUTANEOUS ONCE
Refills: 0 | Status: COMPLETED | OUTPATIENT
Start: 2019-11-04 | End: 2019-11-04

## 2019-11-04 RX ADMIN — SODIUM CHLORIDE 500 MILLILITER(S): 9 INJECTION INTRAMUSCULAR; INTRAVENOUS; SUBCUTANEOUS at 20:31

## 2019-11-04 RX ADMIN — Medication 3 MILLILITER(S): at 20:31

## 2019-11-04 RX ADMIN — Medication 60 MILLIGRAM(S): at 20:32

## 2019-11-04 RX ADMIN — SODIUM CHLORIDE 500 MILLILITER(S): 9 INJECTION INTRAMUSCULAR; INTRAVENOUS; SUBCUTANEOUS at 21:00

## 2019-11-04 NOTE — H&P ADULT - NSHPPHYSICALEXAM_GEN_ALL_CORE
Vital Signs Last 24 Hrs  T(C): 36.7 (05 Nov 2019 00:46), Max: 36.7 (04 Nov 2019 17:59)  T(F): 98 (05 Nov 2019 00:46), Max: 98 (04 Nov 2019 17:59)  HR: 91 (05 Nov 2019 00:46) (80 - 98)  BP: 130/73 (05 Nov 2019 00:46) (104/86 - 130/73)  BP(mean): --  RR: 18 (05 Nov 2019 00:46) (17 - 18)  SpO2: 91% (05 Nov 2019 00:46) (91% - 95%)    PHYSICAL EXAM:  GENERAL: NAD, well-developed  HEAD:  Atraumatic, Normocephalic  EYES: EOMI, PERRLA, conjunctiva and sclera clear  NECK: Supple, No JVD  CHEST/LUNG: Clear to auscultation bilaterally; No wheeze  HEART: Regular rate and rhythm; s1+ s2+  ABDOMEN: Soft, Nontender, Nondistended; Bowel sounds present  EXTREMITIES:, No clubbing, cyanosis, or edema  NEUROLOGY:AAOx3 non-focal  SKIN: No rashes or lesions

## 2019-11-04 NOTE — ED PROVIDER NOTE - OBJECTIVE STATEMENT
83 y/o F pt with PMHx of asthma, COPD, breast CA, GERD, and Crohn's disease, and no significant PSHx presents to ED from [facility] for continued COPD care. Per pt, she took extra Gabapentin on 10/30 for her leg pain and that EMS had found her unconscious and broke into her house. Pt states she regained consciousness when EMS was at her house. Pt was at [facility] from 10/30 until today, but left AMA because she was unsatisfied with her care and relates that she was not given her COPD medications while there. Pt denies fevers, chills, cough, chest pain, and any other acute complaints.

## 2019-11-04 NOTE — H&P ADULT - ATTENDING COMMENTS
Vital Signs Last 24 Hrs  T(C): 36.3 (04 Nov 2019 23:25), Max: 36.7 (04 Nov 2019 17:59)  T(F): 97.4 (04 Nov 2019 23:25), Max: 98 (04 Nov 2019 17:59)  HR: 80 (04 Nov 2019 23:25) (80 - 98)  BP: 104/86 (04 Nov 2019 23:25) (104/86 - 121/85)  BP(mean): --  RR: 17 (04 Nov 2019 23:25) (17 - 18)  SpO2: 95% (04 Nov 2019 23:25) (95% - 95%) Patient seen and examined ; case was discussed with the admitting resident    ROS: as in the HPI; all other ROS negative    SH and family history as above    Vital Signs Last 24 Hrs  T(C): 36.7 (05 Nov 2019 00:46), Max: 36.7 (04 Nov 2019 17:59)  T(F): 98 (05 Nov 2019 00:46), Max: 98 (04 Nov 2019 17:59)  HR: 91 (05 Nov 2019 00:46) (80 - 98)  BP: 130/73 (05 Nov 2019 00:46) (104/86 - 130/73)  BP(mean): --  RR: 18 (05 Nov 2019 00:46) (17 - 18)  SpO2: 91% (05 Nov 2019 00:46) (91% - 95%)    GEN: NAD  HEENT- normocephalic; mouth moist  CVS- S1S2+  LUNGS- clear to auscultation; no wheezing  ABD: Soft , nontender, nondistended, Bowel sounds are present  EXTREMITY: no calf tenderness, no cyanosis, +edema with L>R  NEURO: AAOx3; non focal neurologic exam; cranial nerves grossly intact  PSYCH: normal affect and behavior   BACK: no swelling or mass;   VASCULAR: ++ distal peripheral pulses  SKIN: warm and dry.       Labs Reviewed:                         14.1   7.62  )-----------( 242      ( 04 Nov 2019 20:34 )             42.5     11-04    140  |  104  |  15  ----------------------------<  153<H>  4.0   |  29  |  0.78    Ca    9.5      04 Nov 2019 20:34    TPro  7.8  /  Alb  3.5  /  TBili  0.4  /  DBili  x   /  AST  29  /  ALT  33  /  AlkPhos  62  11-04    CARDIAC MARKERS ( 04 Nov 2019 20:34 )  <0.015 ng/mL / x     / x     / x     / x        BNP: Serum Pro-Brain Natriuretic Peptide: 156 pg/mL (11-04 @ 20:34)    CXR reviewed  EKG Reviewed  Prev hospitalizations Reviewed  Ambulatory documents reviewed   Recent Echo reviewed       83 y/o F with asthma, chronic neuropathy, Crohn's disease not on biologic tx, breast ca s/p mastectomy on AI admitted with acute hypoxic respiratory failure 2/2 asthma exacerbation. Patient had recent hospitalization after being found unconscious thought to be 2/2 gabapentin. She was upset there, but does not provide much details over the course of her stay in regards to workup. She mentioned she was upset about anticoagulation injections. She states they had her on a cardiac monitor, that nothing was wrong with her breathing and that they wanted to send her to rehab and take away her independence. She states she did not feel well enough to go home when she left AMA. She denies SI. We discussed r/b/a to doing a ct angio chest to r/o pe and she agreed but then refused even after discussing and addressing her concerns, and we counseled her that this is a life threatening condition. She denies fever, sick contacts, chest pain, denies shortness of breath but then states that her breathing is improved by albuterol.     1. Acute hypoxic respiratory failure 2/2 asthma exacerbation- refused CT chest to eval PE as above, LE dopplers negative, recent echo reviewed with mildly elevated RVSP, normal diastole, no major valvular pathology, normal ventricular dimensions and with preserved EF, trivial effusion. Continue duonebs, nc to keep O2>92%, steroids. Will hold antibiotics given no clinical e/o concomitant PNA and no known dx of copd, flu negative. Appreciate pulmonology consultation, patient's personal pulmonologist is Dr. Adam.  2. Recent gabapentin overdose- patient denies SI, hold all further doses, try to obtain records from previous hospitalization as details unclear  3. Crohn's Disease- denies symptoms of active disease, continue mesalamine   4. H/o breast Ca- on AI, add dvt ppx   5. DM- check A1C, reported to be elevated in prev documentation, monitor for steroid induced hyperglycemia   6. Gastritis- cont ppi, patient might prefer to take Dexilant, has her own medication     Plan of care discussed with patient ;  all questions and concerns were addressed.

## 2019-11-04 NOTE — ED ADULT NURSE NOTE - NSSUHOSCREENINGYN_ED_ALL_ED
Vimal White   MRN: [id number]    Department:  THE [facility] Emergency Department in [place]   Date of Visit:  9/15/2019           Disclosure     Insurance plans vary and the physician(s) referred by the ER may not be covered by your plan.  Please con tell this physician (or your personal doctor if your instructions are to return to your personal doctor) about any new or lasting problems. The primary care or specialist physician will see patients referred from the [facility] Emergency Department.  Arthor Bernheim Yes - the patient is able to be screened

## 2019-11-04 NOTE — ED PROVIDER NOTE - PROGRESS NOTE DETAILS
On initial observation pt's O2 was 84%. Pt was given 2 liters O2 and pt's O2 came up to 94%. Pt was given Duoneb and pt's O2 was still at 92% on 2liters of O2. Will admit for COPD exacerbation.

## 2019-11-04 NOTE — H&P ADULT - HISTORY OF PRESENT ILLNESS
Patient is 84 year old, lives by herself, independent has medical history significant for Crohn's disease, asthma (uses nebulizer), acid reflux, sciatica on left side came to ED after leaving AM from [facility].   Per pt she was in her usual health in september she had an accident when she injured her leg, after injury she was taking gabapentin for pain. Per pt she was found unconscious at her home and she was taken to [facility] for further work up but after she left [place] today earlier because of unsatisfactory care. Pt came to ED , pt was found hypoxic to 80 on room air, per pt she has dry cough for few days. Denies any fever, sob, chest pain, dizziness, n/v/d, urinary symptom, denies . Pt has left leg pain. Patient is 84 year old, lives by herself, independent has medical history significant for Crohn's disease, asthma (uses nebulizer), acid reflux, sciatica on left side came to ED after leaving AMA from [facility].   Per pt she was in her usual health in september she had an accident when she injured her leg, after injury she was taking gabapentin for pain. Per pt she was found unconscious at her home and she was taken to [facility] for further work up but after she left [place] today earlier because of unsatisfactory care. Pt came to ED , pt was found hypoxic to 80 on room air, per pt she has dry cough for few days. Denies any fever, sob, chest pain, dizziness, n/v/d, urinary symptom, denies . Pt has left leg pain.      Ed course: In the ED pt had ekg showed NSR with sinus arrythmia, pt had doppler which was negative for DVT, since pt was hypoxic pt was advised to get CT A scan of chest to r/o PE. pt initially agreed but later refused, pt understands risk. Discussed with pt at bedside.

## 2019-11-04 NOTE — H&P ADULT - NSICDXPASTMEDICALHX_GEN_ALL_CORE_FT
PAST MEDICAL HISTORY:  Asthma     Breast cancer     Crohn's disease     GERD (gastroesophageal reflux disease)

## 2019-11-04 NOTE — H&P ADULT - ASSESSMENT
Patient is 84 year old, lives by herself, independent has medical history significant for Crohn's disease, asthma (uses nebulizer), acid reflux, sciatica on left side came to ED after leaving AMA from [facility]. Pt came to ED , pt was found hypoxic to 80 on room air, per pt she has dry cough for few days. Denies any fever, sob, chest pain, dizziness, n/v/d, urinary symptom, denies . Pt has left leg pain.      Ed course: In the ED pt had ekg showed NSR with sinus arrythmia, pt had doppler which was negative for DVT, since pt was hypoxic pt was advised to get CT A scan of chest to r/o PE. pt initially agreed but later refused, pt understands risk. Discussed with pt at bedside.

## 2019-11-04 NOTE — H&P ADULT - PROBLEM SELECTOR PLAN 1
likely from asthma exacerbation   pt saturating 94% on 3L nc  pt home pt uses advir, montelukast and duonebs   pt has dry cough but did not have wheeze on exam   c/w Solu-mderol for now + Duoneb + Symbicort +Singulair  + Robitussin   f/u flu panel   cxr did not show infiltrate  wells score 0 (pt does not want to through CT A currently)  pt is following up with Dr. Adam as pulmonologist    will consult Dr. Adam

## 2019-11-05 ENCOUNTER — TRANSCRIPTION ENCOUNTER (OUTPATIENT)
Age: 84
End: 2019-11-05

## 2019-11-05 DIAGNOSIS — J45.901 UNSPECIFIED ASTHMA WITH (ACUTE) EXACERBATION: ICD-10-CM

## 2019-11-05 DIAGNOSIS — M54.30 SCIATICA, UNSPECIFIED SIDE: ICD-10-CM

## 2019-11-05 DIAGNOSIS — Z29.9 ENCOUNTER FOR PROPHYLACTIC MEASURES, UNSPECIFIED: ICD-10-CM

## 2019-11-05 DIAGNOSIS — J96.01 ACUTE RESPIRATORY FAILURE WITH HYPOXIA: ICD-10-CM

## 2019-11-05 DIAGNOSIS — K50.90 CROHN'S DISEASE, UNSPECIFIED, WITHOUT COMPLICATIONS: ICD-10-CM

## 2019-11-05 DIAGNOSIS — K21.9 GASTRO-ESOPHAGEAL REFLUX DISEASE WITHOUT ESOPHAGITIS: ICD-10-CM

## 2019-11-05 DIAGNOSIS — C50.919 MALIGNANT NEOPLASM OF UNSPECIFIED SITE OF UNSPECIFIED FEMALE BREAST: ICD-10-CM

## 2019-11-05 LAB
24R-OH-CALCIDIOL SERPL-MCNC: 29.3 NG/ML — LOW (ref 30–80)
ALBUMIN SERPL ELPH-MCNC: 2.9 G/DL — LOW (ref 3.5–5)
ALP SERPL-CCNC: 55 U/L — SIGNIFICANT CHANGE UP (ref 40–120)
ALT FLD-CCNC: 28 U/L DA — SIGNIFICANT CHANGE UP (ref 10–60)
ANION GAP SERPL CALC-SCNC: 8 MMOL/L — SIGNIFICANT CHANGE UP (ref 5–17)
AST SERPL-CCNC: 22 U/L — SIGNIFICANT CHANGE UP (ref 10–40)
BILIRUB SERPL-MCNC: 0.3 MG/DL — SIGNIFICANT CHANGE UP (ref 0.2–1.2)
BUN SERPL-MCNC: 18 MG/DL — SIGNIFICANT CHANGE UP (ref 7–18)
CALCIUM SERPL-MCNC: 9.3 MG/DL — SIGNIFICANT CHANGE UP (ref 8.4–10.5)
CHLORIDE SERPL-SCNC: 106 MMOL/L — SIGNIFICANT CHANGE UP (ref 96–108)
CHOLEST SERPL-MCNC: 225 MG/DL — HIGH (ref 10–199)
CO2 SERPL-SCNC: 27 MMOL/L — SIGNIFICANT CHANGE UP (ref 22–31)
CREAT SERPL-MCNC: 0.75 MG/DL — SIGNIFICANT CHANGE UP (ref 0.5–1.3)
FLU A RESULT: SIGNIFICANT CHANGE UP
FLU A RESULT: SIGNIFICANT CHANGE UP
FLUAV AG NPH QL: SIGNIFICANT CHANGE UP
FLUBV AG NPH QL: SIGNIFICANT CHANGE UP
FOLATE SERPL-MCNC: 16.3 NG/ML — SIGNIFICANT CHANGE UP
GLUCOSE SERPL-MCNC: 179 MG/DL — HIGH (ref 70–99)
HBA1C BLD-MCNC: 6.2 % — HIGH (ref 4–5.6)
HCT VFR BLD CALC: 39.7 % — SIGNIFICANT CHANGE UP (ref 34.5–45)
HDLC SERPL-MCNC: 66 MG/DL — SIGNIFICANT CHANGE UP
HGB BLD-MCNC: 13.3 G/DL — SIGNIFICANT CHANGE UP (ref 11.5–15.5)
LIPID PNL WITH DIRECT LDL SERPL: 151 MG/DL — SIGNIFICANT CHANGE UP
MAGNESIUM SERPL-MCNC: 2.2 MG/DL — SIGNIFICANT CHANGE UP (ref 1.6–2.6)
MCHC RBC-ENTMCNC: 31.7 PG — SIGNIFICANT CHANGE UP (ref 27–34)
MCHC RBC-ENTMCNC: 33.5 GM/DL — SIGNIFICANT CHANGE UP (ref 32–36)
MCV RBC AUTO: 94.5 FL — SIGNIFICANT CHANGE UP (ref 80–100)
NRBC # BLD: 0 /100 WBCS — SIGNIFICANT CHANGE UP (ref 0–0)
PHOSPHATE SERPL-MCNC: 3 MG/DL — SIGNIFICANT CHANGE UP (ref 2.5–4.5)
PLATELET # BLD AUTO: 244 K/UL — SIGNIFICANT CHANGE UP (ref 150–400)
POTASSIUM SERPL-MCNC: 4.1 MMOL/L — SIGNIFICANT CHANGE UP (ref 3.5–5.3)
POTASSIUM SERPL-SCNC: 4.1 MMOL/L — SIGNIFICANT CHANGE UP (ref 3.5–5.3)
PROT SERPL-MCNC: 6.8 G/DL — SIGNIFICANT CHANGE UP (ref 6–8.3)
RBC # BLD: 4.2 M/UL — SIGNIFICANT CHANGE UP (ref 3.8–5.2)
RBC # FLD: 13.5 % — SIGNIFICANT CHANGE UP (ref 10.3–14.5)
RSV RESULT: SIGNIFICANT CHANGE UP
RSV RNA RESP QL NAA+PROBE: SIGNIFICANT CHANGE UP
SODIUM SERPL-SCNC: 141 MMOL/L — SIGNIFICANT CHANGE UP (ref 135–145)
TOTAL CHOLESTEROL/HDL RATIO MEASUREMENT: 3.4 RATIO — SIGNIFICANT CHANGE UP (ref 3.3–7.1)
TRIGL SERPL-MCNC: 39 MG/DL — SIGNIFICANT CHANGE UP (ref 10–149)
TSH SERPL-MCNC: 0.26 UU/ML — LOW (ref 0.34–4.82)
VIT B12 SERPL-MCNC: >2000 PG/ML — HIGH (ref 232–1245)
WBC # BLD: 5.41 K/UL — SIGNIFICANT CHANGE UP (ref 3.8–10.5)
WBC # FLD AUTO: 5.41 K/UL — SIGNIFICANT CHANGE UP (ref 3.8–10.5)

## 2019-11-05 PROCEDURE — 99233 SBSQ HOSP IP/OBS HIGH 50: CPT | Mod: GC

## 2019-11-05 RX ORDER — DEXLANSOPRAZOLE 30 MG/1
1 CAPSULE, DELAYED RELEASE ORAL
Qty: 0 | Refills: 0 | DISCHARGE

## 2019-11-05 RX ORDER — PREGABALIN 225 MG/1
5000 CAPSULE ORAL DAILY
Refills: 0 | Status: DISCONTINUED | OUTPATIENT
Start: 2019-11-05 | End: 2019-11-05

## 2019-11-05 RX ORDER — MESALAMINE 400 MG
1600 TABLET, DELAYED RELEASE (ENTERIC COATED) ORAL EVERY 12 HOURS
Refills: 0 | Status: DISCONTINUED | OUTPATIENT
Start: 2019-11-05 | End: 2019-11-05

## 2019-11-05 RX ORDER — LETROZOLE 2.5 MG/1
2.5 TABLET, FILM COATED ORAL DAILY
Refills: 0 | Status: DISCONTINUED | OUTPATIENT
Start: 2019-11-05 | End: 2019-11-05

## 2019-11-05 RX ORDER — LETROZOLE 2.5 MG/1
2.5 TABLET, FILM COATED ORAL DAILY
Refills: 0 | Status: DISCONTINUED | OUTPATIENT
Start: 2019-11-05 | End: 2019-11-08

## 2019-11-05 RX ORDER — MESALAMINE 400 MG
1600 TABLET, DELAYED RELEASE (ENTERIC COATED) ORAL
Refills: 0 | Status: DISCONTINUED | OUTPATIENT
Start: 2019-11-05 | End: 2019-11-05

## 2019-11-05 RX ORDER — OMEPRAZOLE 10 MG/1
0 CAPSULE, DELAYED RELEASE ORAL
Qty: 0 | Refills: 0 | DISCHARGE

## 2019-11-05 RX ORDER — LETROZOLE 2.5 MG/1
1 TABLET, FILM COATED ORAL
Qty: 0 | Refills: 0 | DISCHARGE

## 2019-11-05 RX ORDER — FLUTICASONE PROPIONATE AND SALMETEROL 50; 250 UG/1; UG/1
0 POWDER ORAL; RESPIRATORY (INHALATION)
Qty: 0 | Refills: 0 | DISCHARGE

## 2019-11-05 RX ORDER — DEXLANSOPRAZOLE 30 MG/1
60 CAPSULE, DELAYED RELEASE ORAL DAILY
Refills: 0 | Status: DISCONTINUED | OUTPATIENT
Start: 2019-11-05 | End: 2019-11-08

## 2019-11-05 RX ORDER — MESALAMINE 400 MG
4 TABLET, DELAYED RELEASE (ENTERIC COATED) ORAL
Qty: 0 | Refills: 0 | DISCHARGE

## 2019-11-05 RX ORDER — IPRATROPIUM/ALBUTEROL SULFATE 18-103MCG
3 AEROSOL WITH ADAPTER (GRAM) INHALATION EVERY 8 HOURS
Refills: 0 | Status: DISCONTINUED | OUTPATIENT
Start: 2019-11-05 | End: 2019-11-08

## 2019-11-05 RX ORDER — MESALAMINE 400 MG
800 TABLET, DELAYED RELEASE (ENTERIC COATED) ORAL EVERY 12 HOURS
Refills: 0 | Status: DISCONTINUED | OUTPATIENT
Start: 2019-11-05 | End: 2019-11-05

## 2019-11-05 RX ORDER — TIOTROPIUM BROMIDE 18 UG/1
0 CAPSULE ORAL; RESPIRATORY (INHALATION)
Qty: 0 | Refills: 0 | DISCHARGE

## 2019-11-05 RX ORDER — DULOXETINE HYDROCHLORIDE 30 MG/1
1 CAPSULE, DELAYED RELEASE ORAL
Qty: 0 | Refills: 0 | DISCHARGE

## 2019-11-05 RX ORDER — INFLUENZA VIRUS VACCINE 15; 15; 15; 15 UG/.5ML; UG/.5ML; UG/.5ML; UG/.5ML
0.5 SUSPENSION INTRAMUSCULAR ONCE
Refills: 0 | Status: DISCONTINUED | OUTPATIENT
Start: 2019-11-05 | End: 2019-11-08

## 2019-11-05 RX ADMIN — BUDESONIDE AND FORMOTEROL FUMARATE DIHYDRATE 2 PUFF(S): 160; 4.5 AEROSOL RESPIRATORY (INHALATION) at 11:45

## 2019-11-05 RX ADMIN — ENOXAPARIN SODIUM 40 MILLIGRAM(S): 100 INJECTION SUBCUTANEOUS at 11:26

## 2019-11-05 RX ADMIN — MONTELUKAST 10 MILLIGRAM(S): 4 TABLET, CHEWABLE ORAL at 11:25

## 2019-11-05 RX ADMIN — Medication 40 MILLIGRAM(S): at 22:36

## 2019-11-05 RX ADMIN — Medication 100 MILLIGRAM(S): at 06:26

## 2019-11-05 RX ADMIN — BUDESONIDE AND FORMOTEROL FUMARATE DIHYDRATE 2 PUFF(S): 160; 4.5 AEROSOL RESPIRATORY (INHALATION) at 22:36

## 2019-11-05 RX ADMIN — Medication 40 MILLIGRAM(S): at 06:26

## 2019-11-05 RX ADMIN — Medication 40 MILLIGRAM(S): at 13:21

## 2019-11-05 RX ADMIN — Medication 3 MILLILITER(S): at 21:14

## 2019-11-05 RX ADMIN — DEXLANSOPRAZOLE 60 MILLIGRAM(S): 30 CAPSULE, DELAYED RELEASE ORAL at 11:25

## 2019-11-05 NOTE — PROGRESS NOTE ADULT - PROBLEM SELECTOR PLAN 1
-likely from asthma exacerbation   -pt saturating 94% on 3L nc  -pt home pt uses advir, montelukast and duonebs   -c/w Solumderol  ,Duoneb, Singulair    - flu panel negative  -cxr did not show infiltrate  -wells score 0 (pt does not want to through CT A currently)  -pt is following up with Dr. Adam as pulmonologist    -Dr. Adam consulted Acute asthma exacerbation with transient hypoxic respiratory failure-likely from asthma exacerbation   Continues steroids and Duoneb  Pulm consult followup

## 2019-11-05 NOTE — DISCHARGE NOTE PROVIDER - NSDCMRMEDTOKEN_GEN_ALL_CORE_FT
Advair Diskus 500 mcg-50 mcg inhalation powder:   Asacol 400 mg oral delayed release tablet: 4 tab(s) orally 2 times a day  Dexilant 60 mg oral delayed release capsule: 1 cap(s) orally once a day  DULoxetine 30 mg oral delayed release capsule: 1 cap(s) orally once a day, As Needed  letrozole 2.5 mg oral tablet: 1 tab(s) orally once a day  meclizine 25 mg oral tablet: 1 tab(s) orally 3 times a day   Singulair 10 mg oral tablet: 1 tab(s) orally 2 times a day Advair Diskus 500 mcg-50 mcg inhalation powder:   Asacol 400 mg oral delayed release tablet: 4 tab(s) orally 2 times a day  azithromycin 500 mg oral tablet: 1 tab(s) orally once a day  Dexilant 60 mg oral delayed release capsule: 1 cap(s) orally once a day  DULoxetine 30 mg oral delayed release capsule: 1 cap(s) orally once a day, As Needed  guaiFENesin 100 mg/5 mL oral liquid: 30 milliliter(s) orally every 6 hours, As Needed -Cough   letrozole 2.5 mg oral tablet: 1 tab(s) orally once a day  Outpatient PT:   predniSONE 10 mg oral tablet: take Prednisone 40 mg OD for 3 days then take 30 mg OD for 3 days then take 20 mg for 3 days then 20 mg for 3 days  then 10mg for 3 days  Singulair 10 mg oral tablet: 1 tab(s) orally 2 times a day

## 2019-11-05 NOTE — DISCHARGE NOTE PROVIDER - NSDCCPCAREPLAN_GEN_ALL_CORE_FT
PRINCIPAL DISCHARGE DIAGNOSIS  Diagnosis: Asthma exacerbation  Assessment and Plan of Treatment: Your oxygen saturation was low when you came to the ED. Your symptoms were due to exacerbation of your Asthma.  Chest Xray was normal. You refused CT Chest which was needed to rule out Pulmonary Embolism. You were managed on duonebs, solumedrol, singulair and Nasal cannula to keep O2 saturation >92%. Follow with your Pulmonoligist Dr. Adam as an outpatient.      SECONDARY DISCHARGE DIAGNOSES  Diagnosis: GERD (gastroesophageal reflux disease)  Assessment and Plan of Treatment: Continue with delixant. Do not lay back immediately after eating. try tosit for sometime after you have dinner.    Diagnosis: Crohn's disease  Assessment and Plan of Treatment: Continue with asacol as prescribed.    Diagnosis: Breast cancer  Assessment and Plan of Treatment: continue with letrozole.    Diagnosis: Sciatica  Assessment and Plan of Treatment: Continue with duloxetine as needed. PRINCIPAL DISCHARGE DIAGNOSIS  Diagnosis: Asthma exacerbation  Assessment and Plan of Treatment: Your oxygen saturation was low when you came to the ED. Your symptoms were due to exacerbation of your Asthma.  Chest Xray was normal. You refused CT Chest which was needed to rule out Pulmonary Embolism. You were managed on duonebs, solumedrol, singulair and Nasal cannula to keep O2 saturation >92%. Follow with your Pulmonoligist Dr. Adam as an outpatient.      SECONDARY DISCHARGE DIAGNOSES  Diagnosis: GERD (gastroesophageal reflux disease)  Assessment and Plan of Treatment: Continue with delixant. Do not lay back immediately after eating. try tosit for sometime after you have dinner.    Diagnosis: Crohn's disease  Assessment and Plan of Treatment: Continue with asacol as prescribed.    Diagnosis: Breast cancer  Assessment and Plan of Treatment: Continue with letrozole.    Diagnosis: Sciatica  Assessment and Plan of Treatment: Continue with duloxetine as needed.

## 2019-11-05 NOTE — DISCHARGE NOTE PROVIDER - CARE PROVIDER_API CALL
Derrek Adam)  Internal Medicine; Pulmonary Disease  [address] [place] [address]  Phone: [phone]  Fax: [phone]  Follow Up Time: Derrek Adam)  Internal Medicine; Pulmonary Disease  [address] [place] [address]  Phone: [phone]  Fax: [phone]  Follow Up Time:

## 2019-11-05 NOTE — DISCHARGE NOTE PROVIDER - NSDCFUSCHEDAPPT_GEN_ALL_CORE_FT
ANTWON ROLON ; 12/03/2019 ; [facility] [address] [facility] ANTWON ROLON ; 12/03/2019 ; [facility] [address] [facility] ANTWON ROLON ; 12/03/2019 ; [facility] [address] [facility] ANTWON ROLON ; 12/03/2019 ; [facility] [address] [facility] ANTWON ROLON ; 12/03/2019 ; [facility] Neuro [address] [facility]

## 2019-11-05 NOTE — PROGRESS NOTE ADULT - PROBLEM SELECTOR PLAN 6
IMPROVE VTE Individual Risk Assessment  RISK                                                         Points  [  ] Previous VTE                                      3  [  ] Thrombophilia                                   2  [  ] Lower limb paralysis                         2 (unable to hold up >15 seconds)    [  ] Current Cancer                                  2       (within 6 months)  [  ] Immobilization > 24 hrs                    1  [  ] ICU/CCU stay > 24 hrs                         1  [x  ] Age > 60                                              1  IMPROVE VTE Score 1  c/w lovenox for dvt ppx

## 2019-11-05 NOTE — CONSULT NOTE ADULT - SUBJECTIVE AND OBJECTIVE BOX
PULMONARY CONSULT NOTE      ANTWON ROLON  MRN-[id number]    Patient is a 84y old  Female who presents with a chief complaint of Dyspnea (04 Nov 2019 23:00)x 3-4 days, Pt was in [facility] and left yesterday to come here, Pt called me mary admitted there with pain lt leg, Coughing and wheezing despite HFN  rx  Hx chart lab and xrays reviewed      History of Present Illness: 	  Patient is 84 year old, lives by herself, independent has medical history significant for Crohn's disease, asthma (uses nebulizer), acid reflux, sciatica on left side came to ED after leaving AMA from [facility].   Per pt she was in her usual health in september she had an accident when she injured her leg, after injury she was taking gabapentin for pain. Per pt she was found unconscious at her home and she was taken to [facility] for further work up but after she left [place] today earlier because of unsatisfactory care. Pt came to ED , pt was found hypoxic to 80 on room air, per pt she has dry cough for few days      Home Medications:  Advair Diskus 500 mcg-50 mcg inhalation powder:  (05 Nov 2019 00:56)  Asacol 400 mg oral delayed release tablet: 4 tab(s) orally 2 times a day (05 Nov 2019 00:54)  Dexilant 60 mg oral delayed release capsule: 1 cap(s) orally once a day (05 Nov 2019 00:55)  DULoxetine 30 mg oral delayed release capsule: 1 cap(s) orally once a day, As Needed (05 Nov 2019 00:55)  letrozole 2.5 mg oral tablet: 1 tab(s) orally once a day (05 Nov 2019 00:55)      MEDICATIONS  (STANDING):  ALBUTerol    90 MICROgram(s) HFA Inhaler 1 Puff(s) Inhalation every 4 hours  albuterol/ipratropium for Nebulization 3 milliLiter(s) Nebulizer every 6 hours  budesonide 160 MICROgram(s)/formoterol 4.5 MICROgram(s) Inhaler 2 Puff(s) Inhalation two times a day  dexlansoprazole DR 60 milliGRAM(s) Oral daily  enoxaparin Injectable 40 milliGRAM(s) SubCutaneous daily  influenza   Vaccine 0.5 milliLiter(s) IntraMuscular once  letrozole 2.5 milliGRAM(s) Oral daily  mesalamine DR Tablet 1600 milliGRAM(s) Oral two times a day  methylPREDNISolone sodium succinate Injectable 40 milliGRAM(s) IV Push every 8 hours  montelukast 10 milliGRAM(s) Oral daily  Vit.B-12 sublingual liquid 5000 mcg/ml 1 milliLiter(s) 1 milliLiter(s) SubLingual daily      MEDICATIONS  (PRN):  guaiFENesin    Syrup 100 milliGRAM(s) Oral every 6 hours PRN Cough      Allergies    penicillin          PAST MEDICAL & SURGICAL HISTORY:  Breast cancer B/L mastectomies  GERD (gastroesophageal reflux disease)  Crohn's disease  Asthma  Admitted to [facility] 12/18      FAMILY HISTORY:  No pertinent family history in first degree relatives  HTN--    DM--   IHD--   Asthma--  COPD --    SOCIAL HISTORY SMOKING--    ETOH --    DRUGS--  Lives alone  at home      REVIEW OF SYSTEMS:  CONSTITUTIONAL: No fever, weight loss, or fatigue   EYES: No eye pain, visual disturbances, or discharge  ENT:  No difficulty hearing, tinnitus, vertigo; No sinus or throat pain  NECK: No pain or stiffness   RESPIRATORY:  cough++  wheezing+   chills--  hemoptysis--    Shortness of Breath+  CARDIOVASCULAR: No chest pain, palpitations, passing out, dizziness, or leg swelling  GASTROINTESTINAL: No abdominal or epigastric pain. No nausea, vomiting, or hematemesis; No diarrhea or constipation. No melena or hematochezia.  GENITOURINARY: No dysuria, frequency, hematuria, or incontinence  NEUROLOGICAL: No headaches, memory loss, loss of strength, numbness, or tremors  SKIN: No itching, burning, rashes, or lesions   LYMPH Nodes: No enlarged glands  ENDOCRINE: No heat or cold intolerance; No hair loss  MUSCULOSKELETAL: No joint pain or swelling; No muscle, back pain but lt leg sciatica pain  ALLERGY AND IMMUNOLOGIC: No hives or eczema      Vital Signs Last 24 Hrs  T(C): 36.8 (05 Nov 2019 05:15), Max: 36.8 (05 Nov 2019 05:15)  T(F): 98.3 (05 Nov 2019 05:15), Max: 98.3 (05 Nov 2019 05:15)  HR: 89 (05 Nov 2019 05:15) (80 - 98)  BP: 121/77 (05 Nov 2019 05:15) (104/86 - 130/73)  BP(mean): --  RR: 18 (05 Nov 2019 05:15) (17 - 18)  SpO2: 91% (05 Nov 2019 05:15) (91% - 95%)  I&O's Detail      PHYSICAL EXAMINATION:    GENERAL: The patient is a well-developed, well-nourished in no apparent distress.   SKIN: No rashes ecchymoses or cyanosis  HEENT: Head is normocephalic and atraumatic. Extraocular muscles are intact. Mucous membranes are moist.   Neck supple LN not felt, JVP not increased  Thyroid not enlarged  Lymphatic: No lymphadenopathy  CHEST- B/L mastectomies scars  Cardiovascular:  S1 S2  heard ,RSR , JVP not increased , syst murmur at apex, No  gallop or rub  Respiratory:  Symmetrical chest wall movements Breathing vesicular , Percussion note normal no dulness   with  no  rales  but end expiratory wheeze  ABDOMEN:  Soft, Non-tender,   No  hepatosplenomegaly ,BS positive		  Extremities: Normal range of motion, No clubbing, cyanosis or edema , No calf tenderness  Vascular: Peripheral pulses palpable 2+ bilaterally  CNS: Alert and oriented x3,  Mood and affect appropriate  Cranial nerves intact  sensory intact  motor Power5/5, DTR 2+   Babinski neg    LABS:                        13.3   5.41  )-----------( 244      ( 05 Nov 2019 06:26 )             39.7     11-05    141  |  106  |  18  ----------------------------<  179<H>  4.1   |  27  |  0.75    Ca    9.3      05 Nov 2019 06:26  Phos  3.0     11-05  Mg     2.2     11-05    TPro  6.8  /  Alb  2.9<L>  /  TBili  0.3  /  DBili  x   /  AST  22  /  ALT  28  /  AlkPhos  55  11-05          CARDIAC MARKERS ( 04 Nov 2019 20:34 )  <0.015 ng/mL / x     / x     / x     / x            Serum Pro-Brain Natriuretic Peptide: 156 pg/mL (11-04-19 @ 20:34)        RADIOLOGY & ADDITIONAL STUDIES:    CXR:< from: Xray Chest 2 Views PA/Lat (11.04.19 @ 19:51) >  Heart size appears within normal limits. No hilar or superior   mediastinal abnormalities are identified. Stable mamillation of the right   hemidiaphragm noted.  2 high density nodular opacities are identified within the periphery of   the left upper lung field, unchanged, most consistent with parenchymal   granulomas.  There is no evidence for interval development of focal infiltrate, lobar   consolidation or pulmonary edema. No pleural effusion or pneumothorax is   demonstrated. No mediastinal shift is noted. The visualized osseous   structures appear unremarkable.      Venous Doppler Legs -- negative

## 2019-11-05 NOTE — PROGRESS NOTE ADULT - PROBLEM SELECTOR PLAN 2
-no diarrhea or abdominal pain   -pt is taking asacol 1600 bid   -c/w home medication -no diarrhea or abdominal pain   - Continue ASACOL

## 2019-11-05 NOTE — CONSULT NOTE ADULT - ASSESSMENT
Asthma exacerbation  B/L mastectomies   Crohn's Disease on asacol  Sciatica      PLAN- HFN rx with duoneb qid   IV steroids x 24 hrs then po prednisone 30 mg qd x 5-6 days  ASACOL 800 mg  2tab bid  P/T  Tylenol prn  S/C lovenox   Thanks for consult

## 2019-11-05 NOTE — DISCHARGE NOTE PROVIDER - ATTENDING COMMENTS
MEDICAL ATTENDING DISCHARGE NOTE :        Patient is a 84y old  Female who presents with a chief complaint of Dyspnea (08 Nov 2019 10:35)            INTERVAL HPI / OVERNIGHT EVENTS: patient with uneventful night;         ----------------------------------------------------------------------------------    REVIEW OF SYSTEMS: no fever; no SOB            Vital Signs Last 24 Hrs    T(C): 36.5 (08 Nov 2019 13:33), Max: 37 (08 Nov 2019 04:48)    T(F): 97.7 (08 Nov 2019 13:33), Max: 98.6 (08 Nov 2019 04:48)    HR: 83 (08 Nov 2019 13:33) (81 - 83)    BP: 140/76 (08 Nov 2019 13:33) (122/66 - 140/76)    BP(mean): --    RR: 16 (08 Nov 2019 13:33) (16 - 17)    SpO2: 100% (08 Nov 2019 13:33) (94% - 100%)        _________________    PHYSICAL EXAM:    ---------------------------     NAD; Normocephalic    LUNGS - no wheezing    HEART: S1 S2+     ABDOMEN: Soft, Nontender, non distended    EXTREMITIES: no cyanosis; no edema            _________________________________________________    LABS:                            12.7     9.93  )-----------( 247      ( 07 Nov 2019 06:10 )               38.9         11-07        143  |  104  |  23<H>    ----------------------------<  88    3.3<L>   |  33<H>  |  0.71        Ca    8.7      07 Nov 2019 06:10        A/P: 83 y/o F with acute asthma exacerbation now resolved    - follow up with primary pulmonologist within 1 week of discharge.    continue steroid taper and bronchodilators.    - Hypokalemia- potassium replaced    Follow up with PCP    Continue all maintenance meds for Crohn's /HTN/ asthma.    Discharged in stable medical condition.                    Plan of care, test results and findings were  discussed with patient ( and HCP &/or primary care giver) ; all questions and concerns were addressed and care was aligned with patient's wishes.    PMD follow up after discharge from the hospital for continued care and outpatient monitoring was advised.    Discharge plans has been  discussed with care team including the housestaff, nursing staff  and discharge planners- ( /  .)

## 2019-11-05 NOTE — PHYSICAL THERAPY INITIAL EVALUATION ADULT - PERTINENT HX OF CURRENT PROBLEM, REHAB EVAL
Patient brought to ED due to SOB. Patient w/ h/o AMA from [place] recently; patient w/ h/o Chron's dse., Asthma, Sciatica.

## 2019-11-05 NOTE — PROGRESS NOTE ADULT - SUBJECTIVE AND OBJECTIVE BOX
PGY1 Note discussed with supervising resident and primary attending.    Patient is a 84y old  Female who presents with a chief complaint of Dyspnea (05 Nov 2019 09:29)      INTERVAL HPI/OVERNIGHT EVENTS: Patient seen and examined at bedside.   MEDICATIONS  (STANDING):  ALBUTerol    90 MICROgram(s) HFA Inhaler 1 Puff(s) Inhalation every 4 hours  albuterol/ipratropium for Nebulization 3 milliLiter(s) Nebulizer every 6 hours  budesonide 160 MICROgram(s)/formoterol 4.5 MICROgram(s) Inhaler 2 Puff(s) Inhalation two times a day  dexlansoprazole DR 60 milliGRAM(s) Oral daily  enoxaparin Injectable 40 milliGRAM(s) SubCutaneous daily  influenza   Vaccine 0.5 milliLiter(s) IntraMuscular once  letrozole 2.5 milliGRAM(s) Oral daily  methylPREDNISolone sodium succinate Injectable 40 milliGRAM(s) IV Push every 8 hours  montelukast 10 milliGRAM(s) Oral daily  Vit.B-12 sublingual liquid 5000 mcg/ml 1 milliLiter(s) 1 milliLiter(s) SubLingual daily    MEDICATIONS  (PRN):  guaiFENesin    Syrup 100 milliGRAM(s) Oral every 6 hours PRN Cough      Allergies    penicillin (Unknown)    Intolerances        REVIEW OF SYSTEMS:  CONSTITUTIONAL: No fever, weight loss, or fatigue  RESPIRATORY: No cough, chills or hemoptysis; No shortness of breath. Wheezes +  CARDIOVASCULAR: No chest pain, palpitations, dizziness, or leg swelling  GASTROINTESTINAL: No abdominal or epigastric pain. No nausea, vomiting, or hematemesis; No diarrhea or constipation. No melena or hematochezia.  NEUROLOGICAL: No headache, memory loss, loss of strength, numbness, or tremors  SKIN: No itching, burning, rashes, or lesions     Vital Signs Last 24 Hrs  T(C): 36.8 (05 Nov 2019 05:15), Max: 36.8 (05 Nov 2019 05:15)  T(F): 98.3 (05 Nov 2019 05:15), Max: 98.3 (05 Nov 2019 05:15)  HR: 89 (05 Nov 2019 05:15) (80 - 98)  BP: 121/77 (05 Nov 2019 05:15) (104/86 - 130/73)  BP(mean): --  RR: 18 (05 Nov 2019 05:15) (17 - 18)  SpO2: 91% (05 Nov 2019 05:15) (91% - 95%)    PHYSICAL EXAM:  GENERAL: NAD, well-groomed, well-developed  HEAD:  Atraumatic, Normocephalic  EYES: EOMI, PERRLA, conjunctiva and sclera clear  NECK: Supple, No JVD, Normal thyroid  CHEST/LUNG: Clear to percussion bilaterally; No rales, rhonchi, wheezing, or rubs  HEART: Regular rate and rhythm; No murmurs, rubs, or gallops  ABDOMEN: Soft, Nontender, Nondistended; Bowel sounds present  NERVOUS SYSTEM:  Alert & Oriented X3, Good concentration; Motor Strength 5/5 B/L   EXTREMITIES:  2+ Peripheral Pulses, No clubbing, cyanosis, or edema  SKIN;    LABS:                        13.3   5.41  )-----------( 244      ( 05 Nov 2019 06:26 )             39.7     11-05    141  |  106  |  18  ----------------------------<  179<H>  4.1   |  27  |  0.75    Ca    9.3      05 Nov 2019 06:26  Phos  3.0     11-05  Mg     2.2     11-05    TPro  6.8  /  Alb  2.9<L>  /  TBili  0.3  /  DBili  x   /  AST  22  /  ALT  28  /  AlkPhos  55  11-05        CAPILLARY BLOOD GLUCOSE          RADIOLOGY & ADDITIONAL TESTS:    Imaging Personally Reviewed:  [x ] YES  [ ] NO    Consultant(s) Notes Reviewed:  [x ] YES  [ ] NO PGY1 Note discussed with supervising resident and primary attending.    Patient is a 84y old  Female who presents with a chief complaint of Dyspnea (05 Nov 2019 09:29)      INTERVAL HPI/OVERNIGHT EVENTS: Patient seen and examined at bedside.   MEDICATIONS  (STANDING):  ALBUTerol    90 MICROgram(s) HFA Inhaler 1 Puff(s) Inhalation every 4 hours  albuterol/ipratropium for Nebulization 3 milliLiter(s) Nebulizer every 6 hours  budesonide 160 MICROgram(s)/formoterol 4.5 MICROgram(s) Inhaler 2 Puff(s) Inhalation two times a day  dexlansoprazole DR 60 milliGRAM(s) Oral daily  enoxaparin Injectable 40 milliGRAM(s) SubCutaneous daily  influenza   Vaccine 0.5 milliLiter(s) IntraMuscular once  letrozole 2.5 milliGRAM(s) Oral daily  methylPREDNISolone sodium succinate Injectable 40 milliGRAM(s) IV Push every 8 hours  montelukast 10 milliGRAM(s) Oral daily  Vit.B-12 sublingual liquid 5000 mcg/ml 1 milliLiter(s) 1 milliLiter(s) SubLingual daily    MEDICATIONS  (PRN):  guaiFENesin    Syrup 100 milliGRAM(s) Oral every 6 hours PRN Cough      Allergies    penicillin (Unknown)    Intolerances        REVIEW OF SYSTEMS:  CONSTITUTIONAL: No fever, weight loss, or fatigue  RESPIRATORY: No cough, chills or hemoptysis;  shortness of breath+. Wheezes +  CARDIOVASCULAR: No chest pain, palpitations, dizziness, or leg swelling  GASTROINTESTINAL: No abdominal or epigastric pain. No nausea, vomiting, or hematemesis; No diarrhea or constipation. No melena or hematochezia.  NEUROLOGICAL: No headache, memory loss, loss of strength, numbness, or tremors  SKIN: No itching, burning, rashes, or lesions     Vital Signs Last 24 Hrs  T(C): 36.8 (05 Nov 2019 05:15), Max: 36.8 (05 Nov 2019 05:15)  T(F): 98.3 (05 Nov 2019 05:15), Max: 98.3 (05 Nov 2019 05:15)  HR: 89 (05 Nov 2019 05:15) (80 - 98)  BP: 121/77 (05 Nov 2019 05:15) (104/86 - 130/73)  BP(mean): --  RR: 18 (05 Nov 2019 05:15) (17 - 18)  SpO2: 91% (05 Nov 2019 05:15) (91% - 95%)    PHYSICAL EXAM:  GENERAL: NAD, well-groomed, well-developed  HEAD:  Atraumatic, Normocephalic  EYES:  conjunctiva and sclera clear  NECK: Supple,   CHEST/LUNG:  No rales;  expiratory wheezing+  HEART: S1S2+  ABDOMEN: Soft, Nontender, Nondistended; Bowel sounds present  NERVOUS SYSTEM:  Alert & Oriented X3, Good concentration; Motor Strength 5/5 B/L   EXTREMITIES:  No clubbing, cyanosis, or edema  SKIN: warm and dry    LABS:                        13.3   5.41  )-----------( 244      ( 05 Nov 2019 06:26 )             39.7     11-05    141  |  106  |  18  ----------------------------<  179<H>  4.1   |  27  |  0.75    Ca    9.3      05 Nov 2019 06:26  Phos  3.0     11-05  Mg     2.2     11-05    TPro  6.8  /  Alb  2.9<L>  /  TBili  0.3  /  DBili  x   /  AST  22  /  ALT  28  /  AlkPhos  55  11-05        CAPILLARY BLOOD GLUCOSE          RADIOLOGY & ADDITIONAL TESTS:    Imaging Personally Reviewed:  [x ] YES  [ ] NO    Consultant(s) Notes Reviewed:  [x ] YES  [ ] NO

## 2019-11-05 NOTE — DISCHARGE NOTE PROVIDER - HOSPITAL COURSE
Patient is 84 year old with PMH of Crohn's disease, asthma (uses nebulizer), acid reflux, sciatica on left side came to ED after leaving AMA from [facility]. Pt was found hypoxic to 80 on room air. In the ED pt had EKG showing NSR with sinus arrythmia and doppler which was negative for DVT, since pt was hypoxic pt was advised to get CT A scan of chest to r/o PE. Pt initially agreed but later refused. CXR did not show infiltrate. RSV panel was negative.    Pt was managed fpr Acute hypoxic respiratory failure 2/2 asthma exacerbation. Pt was managed on duonebs, solumedrol, singulair and Nasal cannula to keep O2 sat>92%. Patient is 84 year old with PMH of Crohn's disease, asthma (uses nebulizer), acid reflux, sciatica on left side came to ED after leaving AMA from [facility]. Pt was found hypoxic to 80 on room air. In the ED pt had EKG showing NSR with sinus arrythmia and doppler which was negative for DVT, since pt was hypoxic pt was advised to get CT A scan of chest to r/o PE. Pt initially agreed but later refused. CXR did not show infiltrate. RSV panel was negative. Pulmonolgist Dr. Adam was consulted.     Pt was managed Asthma exacerbation. Pt was managed on duonebs, solumedrol, singulair and Nasal cannula to keep O2 sat>92%. Patient is 84 year old with PMH of Crohn's disease, asthma (uses nebulizer), acid reflux, sciatica on left side came to ED after leaving AMA from [facility]. Pt was found hypoxic to 80 on room air. In the ED pt had EKG showing NSR with sinus arrythmia and doppler which was negative for DVT, since pt was hypoxic pt was advised to get CT A scan of chest to r/o PE. Pt initially agreed but later refused. CXR did not show infiltrate. RSV panel was negative. Pulmonolgist Dr. Adam was consulted.     Pt was managed for Asthma exacerbation. Pt was managed on duonebs, solumedrol, singulair, azithromycin  and Nasal cannula to keep O2 sat>92%. .    Overall condition improved. Pt ready for discharge.

## 2019-11-05 NOTE — PROGRESS NOTE ADULT - ASSESSMENT
Patient is 84 year old, lives by herself, independent has medical history significant for Crohn's disease, asthma (uses nebulizer), acid reflux, sciatica on left side came to ED after leaving AMA from [facility]. Pt came to ED , pt was found hypoxic to 80 on room air, per pt she has dry cough for few days. Denies any fever, sob, chest pain, dizziness, n/v/d, urinary symptom, denies . Pt has left leg pain.

## 2019-11-06 LAB
ANION GAP SERPL CALC-SCNC: 6 MMOL/L — SIGNIFICANT CHANGE UP (ref 5–17)
BASOPHILS # BLD AUTO: 0.02 K/UL — SIGNIFICANT CHANGE UP (ref 0–0.2)
BASOPHILS NFR BLD AUTO: 0.2 % — SIGNIFICANT CHANGE UP (ref 0–2)
BUN SERPL-MCNC: 24 MG/DL — HIGH (ref 7–18)
CALCIUM SERPL-MCNC: 9.3 MG/DL — SIGNIFICANT CHANGE UP (ref 8.4–10.5)
CHLORIDE SERPL-SCNC: 105 MMOL/L — SIGNIFICANT CHANGE UP (ref 96–108)
CO2 SERPL-SCNC: 29 MMOL/L — SIGNIFICANT CHANGE UP (ref 22–31)
CREAT SERPL-MCNC: 0.76 MG/DL — SIGNIFICANT CHANGE UP (ref 0.5–1.3)
EOSINOPHIL # BLD AUTO: 0 K/UL — SIGNIFICANT CHANGE UP (ref 0–0.5)
EOSINOPHIL NFR BLD AUTO: 0 % — SIGNIFICANT CHANGE UP (ref 0–6)
GLUCOSE BLDC GLUCOMTR-MCNC: 131 MG/DL — HIGH (ref 70–99)
GLUCOSE SERPL-MCNC: 149 MG/DL — HIGH (ref 70–99)
HCT VFR BLD CALC: 40.4 % — SIGNIFICANT CHANGE UP (ref 34.5–45)
HGB BLD-MCNC: 13.4 G/DL — SIGNIFICANT CHANGE UP (ref 11.5–15.5)
IMM GRANULOCYTES NFR BLD AUTO: 0.6 % — SIGNIFICANT CHANGE UP (ref 0–1.5)
LYMPHOCYTES # BLD AUTO: 1.35 K/UL — SIGNIFICANT CHANGE UP (ref 1–3.3)
LYMPHOCYTES # BLD AUTO: 11.8 % — LOW (ref 13–44)
MCHC RBC-ENTMCNC: 30.9 PG — SIGNIFICANT CHANGE UP (ref 27–34)
MCHC RBC-ENTMCNC: 33.2 GM/DL — SIGNIFICANT CHANGE UP (ref 32–36)
MCV RBC AUTO: 93.3 FL — SIGNIFICANT CHANGE UP (ref 80–100)
MONOCYTES # BLD AUTO: 0.62 K/UL — SIGNIFICANT CHANGE UP (ref 0–0.9)
MONOCYTES NFR BLD AUTO: 5.4 % — SIGNIFICANT CHANGE UP (ref 2–14)
NEUTROPHILS # BLD AUTO: 9.35 K/UL — HIGH (ref 1.8–7.4)
NEUTROPHILS NFR BLD AUTO: 82 % — HIGH (ref 43–77)
NRBC # BLD: 0 /100 WBCS — SIGNIFICANT CHANGE UP (ref 0–0)
PLATELET # BLD AUTO: 270 K/UL — SIGNIFICANT CHANGE UP (ref 150–400)
POTASSIUM SERPL-MCNC: 3.9 MMOL/L — SIGNIFICANT CHANGE UP (ref 3.5–5.3)
POTASSIUM SERPL-SCNC: 3.9 MMOL/L — SIGNIFICANT CHANGE UP (ref 3.5–5.3)
RBC # BLD: 4.33 M/UL — SIGNIFICANT CHANGE UP (ref 3.8–5.2)
RBC # FLD: 13.4 % — SIGNIFICANT CHANGE UP (ref 10.3–14.5)
SODIUM SERPL-SCNC: 140 MMOL/L — SIGNIFICANT CHANGE UP (ref 135–145)
WBC # BLD: 11.41 K/UL — HIGH (ref 3.8–10.5)
WBC # FLD AUTO: 11.41 K/UL — HIGH (ref 3.8–10.5)

## 2019-11-06 PROCEDURE — 99232 SBSQ HOSP IP/OBS MODERATE 35: CPT | Mod: GC

## 2019-11-06 RX ORDER — AZITHROMYCIN 500 MG/1
500 TABLET, FILM COATED ORAL DAILY
Refills: 0 | Status: COMPLETED | OUTPATIENT
Start: 2019-11-06 | End: 2019-11-08

## 2019-11-06 RX ORDER — MONTELUKAST 4 MG/1
10 TABLET, CHEWABLE ORAL AT BEDTIME
Refills: 0 | Status: DISCONTINUED | OUTPATIENT
Start: 2019-11-06 | End: 2019-11-08

## 2019-11-06 RX ADMIN — Medication 3 MILLILITER(S): at 21:48

## 2019-11-06 RX ADMIN — Medication 3 MILLILITER(S): at 05:26

## 2019-11-06 RX ADMIN — ENOXAPARIN SODIUM 40 MILLIGRAM(S): 100 INJECTION SUBCUTANEOUS at 12:06

## 2019-11-06 RX ADMIN — Medication 3 MILLILITER(S): at 14:54

## 2019-11-06 RX ADMIN — AZITHROMYCIN 500 MILLIGRAM(S): 500 TABLET, FILM COATED ORAL at 12:05

## 2019-11-06 RX ADMIN — DEXLANSOPRAZOLE 60 MILLIGRAM(S): 30 CAPSULE, DELAYED RELEASE ORAL at 12:04

## 2019-11-06 RX ADMIN — BUDESONIDE AND FORMOTEROL FUMARATE DIHYDRATE 2 PUFF(S): 160; 4.5 AEROSOL RESPIRATORY (INHALATION) at 21:36

## 2019-11-06 RX ADMIN — LETROZOLE 2.5 MILLIGRAM(S): 2.5 TABLET, FILM COATED ORAL at 12:04

## 2019-11-06 RX ADMIN — BUDESONIDE AND FORMOTEROL FUMARATE DIHYDRATE 2 PUFF(S): 160; 4.5 AEROSOL RESPIRATORY (INHALATION) at 11:30

## 2019-11-06 RX ADMIN — Medication 40 MILLIGRAM(S): at 06:12

## 2019-11-06 RX ADMIN — Medication 100 MILLIGRAM(S): at 06:12

## 2019-11-06 NOTE — PROGRESS NOTE ADULT - SUBJECTIVE AND OBJECTIVE BOX
PULMONARY  progress note    ANTWON ROLON  MRN-[id number]    Patient is a 84y old  Female who presents with a chief complaint of Dyspnea (05 Nov 2019 16:47)  c/o sob , no chest pain, wheeze+        MEDICATIONS  (STANDING):  ALBUTerol    90 MICROgram(s) HFA Inhaler 1 Puff(s) Inhalation every 4 hours  albuterol/ipratropium for Nebulization 3 milliLiter(s) Nebulizer every 8 hours  azithromycin   Tablet 500 milliGRAM(s) Oral daily  budesonide 160 MICROgram(s)/formoterol 4.5 MICROgram(s) Inhaler 2 Puff(s) Inhalation two times a day  dexlansoprazole DR 60 milliGRAM(s) Oral daily  enoxaparin Injectable 40 milliGRAM(s) SubCutaneous daily  influenza   Vaccine 0.5 milliLiter(s) IntraMuscular once  letrozole 2.5 milliGRAM(s) Oral daily  montelukast 10 milliGRAM(s) Oral at bedtime  Vit.B-12 sublingual liquid 5000 mcg/ml 1 milliLiter(s) 1 milliLiter(s) SubLingual daily        Allergies    penicillin (Unknown)        PAST MEDICAL & SURGICAL HISTORY:  Breast cancer  GERD (gastroesophageal reflux disease)  Crohn's disease  Asthma  No significant past surgical history           REVIEW OF SYSTEMS:  CONSTITUTIONAL: No fever, weight loss, or fatigue   EYES: No eye pain, visual disturbances, or discharge  ENT:  No difficulty hearing, tinnitus, vertigo; No sinus or throat pain  NECK: No pain or stiffness or nodes  RESPIRATORY:  cough+   wheezing+   chills --  hemoptysis--  Shortness of Breath++  CARDIOVASCULAR: No chest pain, palpitations, passing out, dizziness, or leg swelling  GASTROINTESTINAL: No abdominal or epigastric pain. No nausea, vomiting, or hematemesis; No diarrhea or constipation. No melena or hematochezia.  GENITOURINARY: No dysuria, frequency, hematuria, or incontinence  NEUROLOGICAL: No headaches, memory loss, loss of strength, numbness, or tremors  SKIN: No itching, burning, rashes, or lesions   LYMPH Nodes: No enlarged glands  ENDOCRINE: No heat or cold intolerance; No hair loss  MUSCULOSKELETAL: No joint pain or swelling; No muscle, back, or extremity pain  PSYCHIATRIC: No depression, anxiety+ mood swings+  HEME/LYMPH: No easy bruising, or bleeding gums  ALLERGY AND IMMUNOLOGIC: No hives or eczema    Vital Signs Last 24 Hrs  T(C): 36.8 (06 Nov 2019 05:23), Max: 36.8 (05 Nov 2019 15:03)  T(F): 98.2 (06 Nov 2019 05:23), Max: 98.2 (05 Nov 2019 15:03)  HR: 86 (06 Nov 2019 05:23) (86 - 95)  BP: 135/76 (06 Nov 2019 05:23) (124/74 - 135/76)  BP(mean): --  RR: 17 (06 Nov 2019 05:23) (17 - 18)  SpO2: 94% (06 Nov 2019 05:23) (90% - 94%)  I&O's Detail      PHYSICAL EXAMINATION:    GENERAL: The patient is a well-developed, well-nourished in no apparent distress.   SKIN no rash ecchymoses or bruises, B/L mastectomy scars  HEENT: Head is normocephalic and atraumatic  MÓNICA , Extraocular muscles are intact. Mucous membranes  moist.   Neck supple ,No LN felt JVP not increased  Thyroid not enlarged  Cardiovascular:  S1 S2 heard, RSR, No JVD , systolic  murmur at apex, No gallop or rub  Respiratory: Chest wall symmetrical with good air entry ,Percussion note normal,    Lungs vesicular breathing with  no  rales , end expiratory   wheeze	  ABDOMEN:  Soft, Non-tender,  no hepatomegaly or splenomegaly BS positive	  Extremities: Normal range of motion, No clubbing, cyanosis or edema  Vascular: Peripheral pulses palpable 2+ bilaterally  CNS:  Alert and oriented x3   Cranial nerves intact  sensory intact  motor power5/5  dtr 2+   Babinski neg    LABS:                        13.4   11.41 )-----------( 270      ( 06 Nov 2019 06:12 )             40.4     11-06    140  |  105  |  24<H>  ----------------------------<  149<H>  3.9   |  29  |  0.76    Ca    9.3      06 Nov 2019 06:12  Phos  3.0     11-05  Mg     2.2     11-05    TPro  6.8  /  Alb  2.9<L>  /  TBili  0.3  /  DBili  x   /  AST  22  /  ALT  28  /  AlkPhos  55  11-05      CARDIAC MARKERS ( 04 Nov 2019 20:34 )  <0.015 ng/mL / x     / x     / x     / x        Serum Pro-Brain Natriuretic Peptide: 156 pg/mL (11-04-19 @ 20:34)      Folate, Serum: 16.3 ng/mL (11-05-19 @ 09:30)  Vitamin B12, Serum: >2000 pg/mL (11-05-19 @ 09:30)            RADIOLOGY & ADDITIONAL STUDIES:    CXR:  < from: Xray Chest 2 Views PA/Lat (11.04.19 @ 19:51) >   No evidence for interval development of focal infiltrate or   lobar consolidation. No pleural effusion identified.

## 2019-11-06 NOTE — PROGRESS NOTE ADULT - ASSESSMENT
Asthma exacerbation  B/L mastectomies   Crohn's Disease on asacol  Sciatica      PLAN- HFN rx with duoneb qid   po prednisone 30 mg qd x 5-6 days  ASACOL 800 mg  2tab bid  P/T  Tylenol prn  S/C lovenox  OOB / BRP

## 2019-11-06 NOTE — PROGRESS NOTE ADULT - SUBJECTIVE AND OBJECTIVE BOX
PGY1 Note discussed with supervising resident and primary attending.    Patient is a 84y old  Female who presents with a chief complaint of Dyspnea (06 Nov 2019 10:33)      INTERVAL HPI/OVERNIGHT EVENTS: Patient seen and examined at bedside. No acute events.     MEDICATIONS  (STANDING):  ALBUTerol    90 MICROgram(s) HFA Inhaler 1 Puff(s) Inhalation every 4 hours  albuterol/ipratropium for Nebulization 3 milliLiter(s) Nebulizer every 8 hours  azithromycin   Tablet 500 milliGRAM(s) Oral daily  budesonide 160 MICROgram(s)/formoterol 4.5 MICROgram(s) Inhaler 2 Puff(s) Inhalation two times a day  dexlansoprazole DR 60 milliGRAM(s) Oral daily  enoxaparin Injectable 40 milliGRAM(s) SubCutaneous daily  influenza   Vaccine 0.5 milliLiter(s) IntraMuscular once  letrozole 2.5 milliGRAM(s) Oral daily  montelukast 10 milliGRAM(s) Oral at bedtime  Vit.B-12 sublingual liquid 5000 mcg/ml 1 milliLiter(s) 1 milliLiter(s) SubLingual daily    MEDICATIONS  (PRN):  guaiFENesin    Syrup 100 milliGRAM(s) Oral every 6 hours PRN Cough      Allergies    penicillin (Unknown)    Intolerances        REVIEW OF SYSTEMS:  CONSTITUTIONAL: No fever, weight loss, or fatigue  RESPIRATORY: No cough, wheezing, chills or hemoptysis; No shortness of breath  CARDIOVASCULAR: No chest pain, palpitations, dizziness, or leg swelling  GASTROINTESTINAL: No abdominal or epigastric pain. No nausea, vomiting, or hematemesis; No diarrhea or constipation. No melena or hematochezia.  NEUROLOGICAL: No headaches, memory loss, loss of strength, numbness, or tremors  SKIN: No itching, burning, rashes, or lesions     Vital Signs Last 24 Hrs  T(C): 36.8 (06 Nov 2019 05:23), Max: 36.8 (05 Nov 2019 15:03)  T(F): 98.2 (06 Nov 2019 05:23), Max: 98.2 (05 Nov 2019 15:03)  HR: 86 (06 Nov 2019 05:23) (86 - 95)  BP: 135/76 (06 Nov 2019 05:23) (124/74 - 135/76)  BP(mean): --  RR: 17 (06 Nov 2019 05:23) (17 - 18)  SpO2: 94% (06 Nov 2019 05:23) (90% - 94%)    PHYSICAL EXAM:  GENERAL: NAD, well-groomed, well-developed  HEAD:  Atraumatic, Normocephalic  EYES: EOMI, PERRLA, conjunctiva and sclera clear  NECK: Supple, No JVD, Normal thyroid  CHEST/LUNG: Clear to percussion bilaterally; No rales, rhonchi, wheezing, or rubs  HEART: Regular rate and rhythm; No murmurs, rubs, or gallops  ABDOMEN: Soft, Nontender, Nondistended; Bowel sounds present  NERVOUS SYSTEM:  Alert & Oriented X3, Good concentration; Motor Strength 5/5 B/L   EXTREMITIES:  2+ Peripheral Pulses, No clubbing, cyanosis, or edema  SKIN;    LABS:                        13.4   11.41 )-----------( 270      ( 06 Nov 2019 06:12 )             40.4     11-06    140  |  105  |  24<H>  ----------------------------<  149<H>  3.9   |  29  |  0.76    Ca    9.3      06 Nov 2019 06:12  Phos  3.0     11-05  Mg     2.2     11-05    TPro  6.8  /  Alb  2.9<L>  /  TBili  0.3  /  DBili  x   /  AST  22  /  ALT  28  /  AlkPhos  55  11-05        CAPILLARY BLOOD GLUCOSE          RADIOLOGY & ADDITIONAL TESTS:    Imaging Personally Reviewed:  [ x] YES  [ ] NO    Consultant(s) Notes Reviewed:  [x ] YES  [ ] NO PGY1 Note discussed with supervising resident and primary attending.    Patient is a 84y old  Female who presents with a chief complaint of Dyspnea (06 Nov 2019 10:33)      INTERVAL HPI/OVERNIGHT EVENTS: Patient seen and examined at bedside. No acute events.     MEDICATIONS  (STANDING):  ALBUTerol    90 MICROgram(s) HFA Inhaler 1 Puff(s) Inhalation every 4 hours  albuterol/ipratropium for Nebulization 3 milliLiter(s) Nebulizer every 8 hours  azithromycin   Tablet 500 milliGRAM(s) Oral daily  budesonide 160 MICROgram(s)/formoterol 4.5 MICROgram(s) Inhaler 2 Puff(s) Inhalation two times a day  dexlansoprazole DR 60 milliGRAM(s) Oral daily  enoxaparin Injectable 40 milliGRAM(s) SubCutaneous daily  influenza   Vaccine 0.5 milliLiter(s) IntraMuscular once  letrozole 2.5 milliGRAM(s) Oral daily  montelukast 10 milliGRAM(s) Oral at bedtime  Vit.B-12 sublingual liquid 5000 mcg/ml 1 milliLiter(s) 1 milliLiter(s) SubLingual daily    MEDICATIONS  (PRN):  guaiFENesin    Syrup 100 milliGRAM(s) Oral every 6 hours PRN Cough      Allergies    penicillin (Unknown)    Intolerances        REVIEW OF SYSTEMS:  CONSTITUTIONAL: No fever, weight loss, or fatigue  RESPIRATORY: No cough, wheezing, chills or hemoptysis; No shortness of breath  CARDIOVASCULAR: No chest pain, palpitations, dizziness, or leg swelling  GASTROINTESTINAL: No abdominal or epigastric pain. No nausea, vomiting,   NEUROLOGICAL: No headaches,  SKIN: No itching, burning, rashes, or lesions     Vital Signs Last 24 Hrs  T(C): 36.8 (06 Nov 2019 05:23), Max: 36.8 (05 Nov 2019 15:03)  T(F): 98.2 (06 Nov 2019 05:23), Max: 98.2 (05 Nov 2019 15:03)  HR: 86 (06 Nov 2019 05:23) (86 - 95)  BP: 135/76 (06 Nov 2019 05:23) (124/74 - 135/76)  BP(mean): --  RR: 17 (06 Nov 2019 05:23) (17 - 18)  SpO2: 94% (06 Nov 2019 05:23) (90% - 94%)    PHYSICAL EXAM:  GENERAL: NAD, well-groomed, well-developed  HEAD:  Atraumatic, Normocephalic  EYES:  conjunctiva and sclera clear  NECK: Supple, No JVD, Normal thyroid  CHEST/LUNG: good air entry  HEART: S1S2+  ABDOMEN: Soft, Nontender, Nondistended; Bowel sounds present  NERVOUS SYSTEM:  Alert & Oriented X3, Good concentration; Motor Strength 5/5 B/L   EXTREMITIES:  2+ Peripheral Pulses, No clubbing, cyanosis, or edema      LABS:                        13.4   11.41 )-----------( 270      ( 06 Nov 2019 06:12 )             40.4     11-06    140  |  105  |  24<H>  ----------------------------<  149<H>  3.9   |  29  |  0.76    Ca    9.3      06 Nov 2019 06:12  Phos  3.0     11-05  Mg     2.2     11-05    TPro  6.8  /  Alb  2.9<L>  /  TBili  0.3  /  DBili  x   /  AST  22  /  ALT  28  /  AlkPhos  55  11-05        CAPILLARY BLOOD GLUCOSE          RADIOLOGY & ADDITIONAL TESTS:    Imaging Personally Reviewed:  [ x] YES  [ ] NO    Consultant(s) Notes Reviewed:  [x ] YES  [ ] NO

## 2019-11-06 NOTE — PROGRESS NOTE ADULT - PROBLEM SELECTOR PLAN 1
-Acute asthma exacerbation with transient hypoxic respiratory failure-likely from asthma exacerbation   -Discontinued solumedrol; changed to prednisone 40 mg   -Continue with Duoneb  -Pulmonologist Dr. Adam

## 2019-11-06 NOTE — PROGRESS NOTE ADULT - ASSESSMENT
Patient is 84 year old, lives by herself, independent has medical history significant for Crohn's disease, asthma (uses nebulizer), acid reflux, sciatica on left side came to ED after leaving AMA from [facility]. Pt came to ED , pt was found hypoxic to 80 on room air, per pt she has dry cough for few days. Denies any fever, sob, chest pain, dizziness, n/v/d, urinary symptom, denies . Pt has left leg pain.

## 2019-11-07 LAB
ANION GAP SERPL CALC-SCNC: 6 MMOL/L — SIGNIFICANT CHANGE UP (ref 5–17)
BASOPHILS # BLD AUTO: 0.02 K/UL — SIGNIFICANT CHANGE UP (ref 0–0.2)
BASOPHILS NFR BLD AUTO: 0.2 % — SIGNIFICANT CHANGE UP (ref 0–2)
BUN SERPL-MCNC: 23 MG/DL — HIGH (ref 7–18)
CALCIUM SERPL-MCNC: 8.7 MG/DL — SIGNIFICANT CHANGE UP (ref 8.4–10.5)
CHLORIDE SERPL-SCNC: 104 MMOL/L — SIGNIFICANT CHANGE UP (ref 96–108)
CO2 SERPL-SCNC: 33 MMOL/L — HIGH (ref 22–31)
CREAT SERPL-MCNC: 0.71 MG/DL — SIGNIFICANT CHANGE UP (ref 0.5–1.3)
EOSINOPHIL # BLD AUTO: 0.05 K/UL — SIGNIFICANT CHANGE UP (ref 0–0.5)
EOSINOPHIL NFR BLD AUTO: 0.5 % — SIGNIFICANT CHANGE UP (ref 0–6)
GLUCOSE SERPL-MCNC: 88 MG/DL — SIGNIFICANT CHANGE UP (ref 70–99)
HCT VFR BLD CALC: 38.9 % — SIGNIFICANT CHANGE UP (ref 34.5–45)
HGB BLD-MCNC: 12.7 G/DL — SIGNIFICANT CHANGE UP (ref 11.5–15.5)
IMM GRANULOCYTES NFR BLD AUTO: 0.6 % — SIGNIFICANT CHANGE UP (ref 0–1.5)
LYMPHOCYTES # BLD AUTO: 2.74 K/UL — SIGNIFICANT CHANGE UP (ref 1–3.3)
LYMPHOCYTES # BLD AUTO: 27.6 % — SIGNIFICANT CHANGE UP (ref 13–44)
MCHC RBC-ENTMCNC: 31.1 PG — SIGNIFICANT CHANGE UP (ref 27–34)
MCHC RBC-ENTMCNC: 32.6 GM/DL — SIGNIFICANT CHANGE UP (ref 32–36)
MCV RBC AUTO: 95.1 FL — SIGNIFICANT CHANGE UP (ref 80–100)
MONOCYTES # BLD AUTO: 0.93 K/UL — HIGH (ref 0–0.9)
MONOCYTES NFR BLD AUTO: 9.4 % — SIGNIFICANT CHANGE UP (ref 2–14)
NEUTROPHILS # BLD AUTO: 6.13 K/UL — SIGNIFICANT CHANGE UP (ref 1.8–7.4)
NEUTROPHILS NFR BLD AUTO: 61.7 % — SIGNIFICANT CHANGE UP (ref 43–77)
NRBC # BLD: 0 /100 WBCS — SIGNIFICANT CHANGE UP (ref 0–0)
PLATELET # BLD AUTO: 247 K/UL — SIGNIFICANT CHANGE UP (ref 150–400)
POTASSIUM SERPL-MCNC: 3.3 MMOL/L — LOW (ref 3.5–5.3)
POTASSIUM SERPL-SCNC: 3.3 MMOL/L — LOW (ref 3.5–5.3)
RBC # BLD: 4.09 M/UL — SIGNIFICANT CHANGE UP (ref 3.8–5.2)
RBC # FLD: 13.5 % — SIGNIFICANT CHANGE UP (ref 10.3–14.5)
SODIUM SERPL-SCNC: 143 MMOL/L — SIGNIFICANT CHANGE UP (ref 135–145)
WBC # BLD: 9.93 K/UL — SIGNIFICANT CHANGE UP (ref 3.8–10.5)
WBC # FLD AUTO: 9.93 K/UL — SIGNIFICANT CHANGE UP (ref 3.8–10.5)

## 2019-11-07 PROCEDURE — 99232 SBSQ HOSP IP/OBS MODERATE 35: CPT | Mod: GC

## 2019-11-07 RX ORDER — POTASSIUM CHLORIDE 20 MEQ
40 PACKET (EA) ORAL ONCE
Refills: 0 | Status: COMPLETED | OUTPATIENT
Start: 2019-11-07 | End: 2019-11-07

## 2019-11-07 RX ADMIN — Medication 40 MILLIGRAM(S): at 06:39

## 2019-11-07 RX ADMIN — Medication 100 MILLIGRAM(S): at 21:38

## 2019-11-07 RX ADMIN — BUDESONIDE AND FORMOTEROL FUMARATE DIHYDRATE 2 PUFF(S): 160; 4.5 AEROSOL RESPIRATORY (INHALATION) at 12:26

## 2019-11-07 RX ADMIN — Medication 3 MILLILITER(S): at 06:28

## 2019-11-07 RX ADMIN — Medication 3 MILLILITER(S): at 21:17

## 2019-11-07 RX ADMIN — DEXLANSOPRAZOLE 60 MILLIGRAM(S): 30 CAPSULE, DELAYED RELEASE ORAL at 12:14

## 2019-11-07 RX ADMIN — Medication 100 MILLIGRAM(S): at 02:45

## 2019-11-07 RX ADMIN — Medication 3 MILLILITER(S): at 14:27

## 2019-11-07 RX ADMIN — Medication 40 MILLIEQUIVALENT(S): at 12:17

## 2019-11-07 RX ADMIN — AZITHROMYCIN 500 MILLIGRAM(S): 500 TABLET, FILM COATED ORAL at 12:17

## 2019-11-07 RX ADMIN — Medication 100 MILLIGRAM(S): at 12:17

## 2019-11-07 RX ADMIN — LETROZOLE 2.5 MILLIGRAM(S): 2.5 TABLET, FILM COATED ORAL at 12:16

## 2019-11-07 NOTE — PROGRESS NOTE ADULT - ASSESSMENT
Asthma exacerbation  B/L mastectomies   Crohn's Disease on asacol  Sciatica      PLAN- HFN rx with duoneb qid   po prednisone 30 mg qd x 4 days  ASACOL 800 mg  2tab bid  P/T   Tylenol prn  S/C lovenox  OOB / BRP   D/c Plan tomorrow with home care
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
## 2019-11-07 NOTE — PROGRESS NOTE ADULT - SUBJECTIVE AND OBJECTIVE BOX
PGY1 Note discussed with supervising resident and primary attending.    Patient is a 84y old  Female who presents with a chief complaint of Dyspnea (07 Nov 2019 10:12)      INTERVAL HPI/OVERNIGHT EVENTS: Patient seen and examined at bedside. No acute events.     MEDICATIONS  (STANDING):  ALBUTerol    90 MICROgram(s) HFA Inhaler 1 Puff(s) Inhalation every 4 hours  albuterol/ipratropium for Nebulization 3 milliLiter(s) Nebulizer every 8 hours  azithromycin   Tablet 500 milliGRAM(s) Oral daily  budesonide 160 MICROgram(s)/formoterol 4.5 MICROgram(s) Inhaler 2 Puff(s) Inhalation two times a day  dexlansoprazole DR 60 milliGRAM(s) Oral daily  enoxaparin Injectable 40 milliGRAM(s) SubCutaneous daily  influenza   Vaccine 0.5 milliLiter(s) IntraMuscular once  letrozole 2.5 milliGRAM(s) Oral daily  montelukast 10 milliGRAM(s) Oral at bedtime  potassium chloride    Tablet ER 40 milliEquivalent(s) Oral once  predniSONE   Tablet 40 milliGRAM(s) Oral daily  Vit.B-12 sublingual liquid 5000 mcg/ml 1 milliLiter(s) 1 milliLiter(s) SubLingual daily    MEDICATIONS  (PRN):  guaiFENesin    Syrup 100 milliGRAM(s) Oral every 6 hours PRN Cough      Allergies    penicillin (Unknown)    Intolerances        REVIEW OF SYSTEMS:  CONSTITUTIONAL: No fever, weight loss, or fatigue  RESPIRATORY: No cough, wheezing, chills or hemoptysis; No shortness of breath  CARDIOVASCULAR: No chest pain, palpitations, dizziness, or leg swelling  GASTROINTESTINAL: No abdominal or epigastric pain. No nausea, vomiting, or hematemesis; No diarrhea or constipation. No melena or hematochezia.  NEUROLOGICAL: No headaches, memory loss, loss of strength, numbness, or tremors  SKIN: No itching, burning, rashes, or lesions     Vital Signs Last 24 Hrs  T(C): 36.7 (07 Nov 2019 05:01), Max: 36.8 (06 Nov 2019 13:44)  T(F): 98.1 (07 Nov 2019 05:01), Max: 98.2 (06 Nov 2019 13:44)  HR: 73 (07 Nov 2019 05:01) (73 - 89)  BP: 125/82 (07 Nov 2019 05:01) (125/82 - 135/72)  BP(mean): --  RR: 17 (07 Nov 2019 05:01) (17 - 18)  SpO2: 95% (07 Nov 2019 08:05) (91% - 95%)    PHYSICAL EXAM:  GENERAL: NAD, well-groomed, well-developed  HEAD:  Atraumatic, Normocephalic  EYES: EOMI, PERRLA, conjunctiva and sclera clear  NECK: Supple, No JVD, Normal thyroid  CHEST/LUNG: Clear to percussion bilaterally; No rales, rhonchi, wheezing, or rubs  HEART: Regular rate and rhythm; No murmurs, rubs, or gallops  ABDOMEN: Soft, Nontender, Nondistended; Bowel sounds present  NERVOUS SYSTEM:  Alert & Oriented X3, Good concentration; Motor Strength 5/5 B/L   EXTREMITIES:  2+ Peripheral Pulses, No clubbing, cyanosis, or edema  SKIN;    LABS:                        12.7   9.93  )-----------( 247      ( 07 Nov 2019 06:10 )             38.9     11-07    143  |  104  |  23<H>  ----------------------------<  88  3.3<L>   |  33<H>  |  0.71    Ca    8.7      07 Nov 2019 06:10          CAPILLARY BLOOD GLUCOSE      POCT Blood Glucose.: 131 mg/dL (06 Nov 2019 17:19)      RADIOLOGY & ADDITIONAL TESTS:    Imaging Personally Reviewed:  [ x] YES  [ ] NO    Consultant(s) Notes Reviewed:  [ xYES  [ ] NO

## 2019-11-07 NOTE — PROGRESS NOTE ADULT - PROBLEM SELECTOR PLAN 1
-Acute asthma exacerbation with transient hypoxic respiratory failure-likely from asthma exacerbation   -c/w prednisone 40 mg   -c/w Duoneb  -Pulmonologist Dr. Adam

## 2019-11-07 NOTE — PROGRESS NOTE ADULT - ASSESSMENT
Patient is 84 year old, lives by herself, independent has medical history significant for Crohn's disease, asthma (uses nebulizer), acid reflux, sciatica on left side came to ED after leaving AMA from [facility]. Pt came to ED , pt was found hypoxic to 80 on room air, per pt she has dry cough for few days. Denies any fever, sob, chest pain, dizziness, n/v/d, urinary symptom, denies . Pt has left leg pain.

## 2019-11-08 ENCOUNTER — TRANSCRIPTION ENCOUNTER (OUTPATIENT)
Age: 84
End: 2019-11-08

## 2019-11-08 VITALS
RESPIRATION RATE: 16 BRPM | HEART RATE: 83 BPM | TEMPERATURE: 98 F | OXYGEN SATURATION: 100 % | SYSTOLIC BLOOD PRESSURE: 140 MMHG | DIASTOLIC BLOOD PRESSURE: 76 MMHG

## 2019-11-08 PROCEDURE — 83036 HEMOGLOBIN GLYCOSYLATED A1C: CPT

## 2019-11-08 PROCEDURE — 99285 EMERGENCY DEPT VISIT HI MDM: CPT | Mod: 25

## 2019-11-08 PROCEDURE — 83880 ASSAY OF NATRIURETIC PEPTIDE: CPT

## 2019-11-08 PROCEDURE — 71046 X-RAY EXAM CHEST 2 VIEWS: CPT

## 2019-11-08 PROCEDURE — 82962 GLUCOSE BLOOD TEST: CPT

## 2019-11-08 PROCEDURE — 85027 COMPLETE CBC AUTOMATED: CPT

## 2019-11-08 PROCEDURE — 93005 ELECTROCARDIOGRAM TRACING: CPT

## 2019-11-08 PROCEDURE — 96374 THER/PROPH/DIAG INJ IV PUSH: CPT

## 2019-11-08 PROCEDURE — 82746 ASSAY OF FOLIC ACID SERUM: CPT

## 2019-11-08 PROCEDURE — 84100 ASSAY OF PHOSPHORUS: CPT

## 2019-11-08 PROCEDURE — 99239 HOSP IP/OBS DSCHRG MGMT >30: CPT

## 2019-11-08 PROCEDURE — 87631 RESP VIRUS 3-5 TARGETS: CPT

## 2019-11-08 PROCEDURE — 94640 AIRWAY INHALATION TREATMENT: CPT

## 2019-11-08 PROCEDURE — 80048 BASIC METABOLIC PNL TOTAL CA: CPT

## 2019-11-08 PROCEDURE — 93970 EXTREMITY STUDY: CPT

## 2019-11-08 PROCEDURE — 84443 ASSAY THYROID STIM HORMONE: CPT

## 2019-11-08 PROCEDURE — 80061 LIPID PANEL: CPT

## 2019-11-08 PROCEDURE — 36415 COLL VENOUS BLD VENIPUNCTURE: CPT

## 2019-11-08 PROCEDURE — 80053 COMPREHEN METABOLIC PANEL: CPT

## 2019-11-08 PROCEDURE — 97161 PT EVAL LOW COMPLEX 20 MIN: CPT

## 2019-11-08 PROCEDURE — 82607 VITAMIN B-12: CPT

## 2019-11-08 PROCEDURE — 84484 ASSAY OF TROPONIN QUANT: CPT

## 2019-11-08 PROCEDURE — 82306 VITAMIN D 25 HYDROXY: CPT

## 2019-11-08 PROCEDURE — 83735 ASSAY OF MAGNESIUM: CPT

## 2019-11-08 RX ORDER — ACETAMINOPHEN 500 MG
650 TABLET ORAL ONCE
Refills: 0 | Status: COMPLETED | OUTPATIENT
Start: 2019-11-08 | End: 2019-11-08

## 2019-11-08 RX ORDER — POTASSIUM CHLORIDE 20 MEQ
40 PACKET (EA) ORAL ONCE
Refills: 0 | Status: COMPLETED | OUTPATIENT
Start: 2019-11-08 | End: 2019-11-08

## 2019-11-08 RX ORDER — AZITHROMYCIN 500 MG/1
1 TABLET, FILM COATED ORAL
Qty: 2 | Refills: 0
Start: 2019-11-08 | End: 2019-11-09

## 2019-11-08 RX ADMIN — Medication 40 MILLIEQUIVALENT(S): at 13:23

## 2019-11-08 RX ADMIN — Medication 40 MILLIGRAM(S): at 05:56

## 2019-11-08 RX ADMIN — AZITHROMYCIN 500 MILLIGRAM(S): 500 TABLET, FILM COATED ORAL at 13:22

## 2019-11-08 RX ADMIN — BUDESONIDE AND FORMOTEROL FUMARATE DIHYDRATE 2 PUFF(S): 160; 4.5 AEROSOL RESPIRATORY (INHALATION) at 13:29

## 2019-11-08 RX ADMIN — LETROZOLE 2.5 MILLIGRAM(S): 2.5 TABLET, FILM COATED ORAL at 13:02

## 2019-11-08 RX ADMIN — Medication 100 MILLIGRAM(S): at 05:58

## 2019-11-08 RX ADMIN — DEXLANSOPRAZOLE 60 MILLIGRAM(S): 30 CAPSULE, DELAYED RELEASE ORAL at 13:02

## 2019-11-08 RX ADMIN — Medication 3 MILLILITER(S): at 06:07

## 2019-11-08 NOTE — PROGRESS NOTE ADULT - SUBJECTIVE AND OBJECTIVE BOX
PULMONARY  progress note    ANTWON ROLON  MRN-[id number]    Patient is a 84y old  Female who presents with a chief complaint of Dyspnea (07 Nov 2019 11:51)  C/o feeling nervous going home. episodic chest tightness      MEDICATIONS  (STANDING):  ALBUTerol    90 MICROgram(s) HFA Inhaler 1 Puff(s) Inhalation every 4 hours  albuterol/ipratropium for Nebulization 3 milliLiter(s) Nebulizer every 8 hours  azithromycin   Tablet 500 milliGRAM(s) Oral daily  budesonide 160 MICROgram(s)/formoterol 4.5 MICROgram(s) Inhaler 2 Puff(s) Inhalation two times a day  dexlansoprazole DR 60 milliGRAM(s) Oral daily  enoxaparin Injectable 40 milliGRAM(s) SubCutaneous daily  influenza   Vaccine 0.5 milliLiter(s) IntraMuscular once  letrozole 2.5 milliGRAM(s) Oral daily  montelukast 10 milliGRAM(s) Oral at bedtime  potassium chloride    Tablet ER 40 milliEquivalent(s) Oral once  predniSONE   Tablet 40 milliGRAM(s) Oral daily  Vit.B-12 sublingual liquid 5000 mcg/ml 1 milliLiter(s) 1 milliLiter(s) SubLingual daily      Allergies    penicillin (Unknown)        PAST MEDICAL & SURGICAL HISTORY:  Breast cancer  GERD (gastroesophageal reflux disease)  Crohn's disease  Asthma  No significant past surgical history           REVIEW OF SYSTEMS:  CONSTITUTIONAL: No fever, weight loss, or fatigue   EYES: No eye pain, visual disturbances, or discharge  ENT:  No difficulty hearing, tinnitus, vertigo; No sinus or throat pain  NECK: No pain or stiffness or nodes  RESPIRATORY:  cough+   wheezing+   chills --  hemoptysis--  Shortness of Breath--  CARDIOVASCULAR: No chest pain, palpitations, passing out, dizziness, or leg swelling  GASTROINTESTINAL: No abdominal or epigastric pain. No nausea, vomiting, or hematemesis; No diarrhea or constipation. No melena or hematochezia.  GENITOURINARY: No dysuria, frequency, hematuria, or incontinence  NEUROLOGICAL: No headaches, memory loss, loss of strength, numbness, or tremors  SKIN: No itching, burning, rashes, or lesions   LYMPH Nodes: No enlarged glands  ENDOCRINE: No heat or cold intolerance; No hair loss  MUSCULOSKELETAL: No joint pain or swelling; No muscle, back, or extremity pain  PSYCHIATRIC: No depression, anxiety++  ALLERGY AND IMMUNOLOGIC: No hives or eczema        Vital Signs Last 24 Hrs  T(C): 37 (08 Nov 2019 04:48), Max: 37 (08 Nov 2019 04:48)  T(F): 98.6 (08 Nov 2019 04:48), Max: 98.6 (08 Nov 2019 04:48)  HR: 81 (08 Nov 2019 04:48) (81 - 105)  BP: 122/66 (08 Nov 2019 04:48) (101/63 - 122/66)  BP(mean): --  RR: 17 (08 Nov 2019 04:48) (17 - 17)  SpO2: 94% (08 Nov 2019 04:48) (94% - 94%)  I&O's Detail      PHYSICAL EXAMINATION:    GENERAL: The patient is a well-developed, well-nourished in no apparent distress.   SKIN no rash ecchymoses or bruises, B/L mastectomies scar++  HEENT: Head is normocephalic and atraumatic  MÓNICA , Extraocular muscles are intact. Mucous membranes  moist.   Neck supple ,No LN felt JVP not increased  Thyroid not enlarged  Cardiovascular:  S1 S2 heard, RSR, No JVD , systolic  murmur at apex, No gallop or rub  Respiratory: Chest wall symmetrical with good air entry ,Percussion note normal,    Lungs vesicular breathing with  no  rales , [place] post tussive   wheeze	  ABDOMEN:  Soft, Non-tender,  no hepatomegaly or splenomegaly BS positive	  Extremities: Normal range of motion, No clubbing, cyanosis or edema  Vascular: Peripheral pulses palpable 2+ bilaterally  CNS:  Alert and oriented x 3   Cranial nerves intact  sensory intact  motor power5/5  dtr 2+   Babinski neg    LABS:                        12.7   9.93  )-----------( 247      ( 07 Nov 2019 06:10 )             38.9     11-07    143  |  104  |  23<H>  ----------------------------<  88  3.3<L>   |  33<H>  |  0.71    Ca    8.7      07 Nov 2019 06:10        Folate, Serum: 16.3 ng/mL (11-05-19 @ 09:30)  Vitamin B12, Serum: >2000 pg/mL (11-05-19 @ 09:30)

## 2019-11-08 NOTE — PROGRESS NOTE ADULT - ASSESSMENT
Asthma exacerbation  B/L mastectomies   Crohn's Disease on asacol  Sciatica      PLAN- HFN rx with duoneb qid   po prednisone 30 mg qd x 4 days, 20 mg qd x 3 days and d/c  ASACOL 800 mg  2tab bid, Femara 2.5 mg qd  P/T   Tylenol prn  S/C lovenox  OOB / BRP   D/c Plan today with home care   F/U in office tuesday 11,30 AM   discussed with pt and Dr Newell

## 2019-11-08 NOTE — PROGRESS NOTE ADULT - ATTENDING COMMENTS
I have examined pt personally Hx chart lab and xrays reviewed and pt discussed with residents
Patient was seen and examined by myself. Case was discussed with house staff in details. I have reviewed and agree with the plan as outlined above with edits where appropriate.    Vital Signs Last 24 Hrs  T(C): 36.6 (07 Nov 2019 21:20), Max: 36.9 (07 Nov 2019 13:32)  T(F): 97.8 (07 Nov 2019 21:20), Max: 98.4 (07 Nov 2019 13:32)  HR: 88 (07 Nov 2019 21:20) (73 - 105)  BP: 108/57 (07 Nov 2019 21:20) (101/63 - 125/82)  BP(mean): --  RR: 17 (07 Nov 2019 21:20) (17 - 17)  SpO2: 94% (07 Nov 2019 21:20) (91% - 95%)      clinically improved overall  continue current care  discharge planning
Patient was seen and examined by myself. Case was discussed with house staff in details. I have reviewed and agree with the plan as outlined above with edits where appropriate.  Vital Signs Last 24 Hrs  T(C): 36.6 (06 Nov 2019 21:48), Max: 36.8 (06 Nov 2019 05:23)  T(F): 97.9 (06 Nov 2019 21:48), Max: 98.2 (06 Nov 2019 05:23)  HR: 82 (06 Nov 2019 21:48) (82 - 89)  BP: 126/72 (06 Nov 2019 21:48) (126/72 - 135/76)  BP(mean): --  RR: 18 (06 Nov 2019 21:48) (17 - 18)  SpO2: 91% (06 Nov 2019 21:48) (91% - 94%)                          13.4   11.41 )-----------( 270      ( 06 Nov 2019 06:12 )             40.4         11-06    140  |  105  |  24<H>  ----------------------------<  149<H>  3.9   |  29  |  0.76    Ca    9.3      06 Nov 2019 06:12  Phos  3.0     11-05  Mg     2.2     11-05    TPro  6.8  /  Alb  2.9<L>  /  TBili  0.3  /  DBili  x   /  AST  22  /  ALT  28  /  AlkPhos  55  11-05      A/P: acute asthma exacerbation  - clinically improving  continue steroids and Symbicort  discharge planning;  counselled on medical compliance
Patient was seen and examined by myself. Case was discussed with house staff in details. I have reviewed and agree with the plan as outlined above with edits where appropriate.    Vital Signs Last 24 Hrs  T(C): 36.8 (05 Nov 2019 15:03), Max: 36.8 (05 Nov 2019 05:15)  T(F): 98.2 (05 Nov 2019 15:03), Max: 98.3 (05 Nov 2019 05:15)  HR: 95 (05 Nov 2019 15:03) (74 - 95)  BP: 127/70 (05 Nov 2019 15:03) (104/86 - 138/79)  BP(mean): --  RR: 18 (05 Nov 2019 15:03) (17 - 18)  SpO2: 93% (05 Nov 2019 15:03) (90% - 95%)                          13.3   5.41  )-----------( 244      ( 05 Nov 2019 06:26 )             39.7     11-05    141  |  106  |  18  ----------------------------<  179<H>  4.1   |  27  |  0.75    Ca    9.3      05 Nov 2019 06:26  Phos  3.0     11-05  Mg     2.2     11-05    TPro  6.8  /  Alb  2.9<L>  /  TBili  0.3  /  DBili  x   /  AST  22  /  ALT  28  /  AlkPhos  55  11-05    Elderly female with acute asthma exacerbation and bronchitis  - steroids  - Azithromycin daily  - Symbicort  - Pulm consulted  - monitor  Plan discussed with patient  in details at bedside and all her questions / concerns were addressed

## 2019-11-08 NOTE — DISCHARGE NOTE NURSING/CASE MANAGEMENT/SOCIAL WORK - PATIENT PORTAL LINK FT
You can access the FollowMyHealth Patient Portal offered by [facility] by registering at the following website: [email or web address]. By joining [facility]’s FollowMyHealth portal, you will also be able to view your health information using other applications (apps) compatible with our system.

## 2019-12-01 NOTE — ED ADULT NURSE REASSESSMENT NOTE - NS ED NURSE REASSESS COMMENT FT1
Returned to ED after moving her car.  Mildly dyspneic, No CP, replaced cardiac monitor. coagulation(Bleeding disorder R/T clinical cond/anti-coags)

## 2019-12-03 ENCOUNTER — APPOINTMENT (OUTPATIENT)
Dept: NEUROLOGY | Facility: CLINIC | Age: 84
End: 2019-12-03

## 2019-12-05 ENCOUNTER — APPOINTMENT (OUTPATIENT)
Dept: ORTHOPEDIC SURGERY | Facility: CLINIC | Age: 84
End: 2019-12-05

## 2019-12-17 ENCOUNTER — APPOINTMENT (OUTPATIENT)
Dept: CARDIOLOGY | Facility: CLINIC | Age: 84
End: 2019-12-17
Payer: MEDICARE

## 2019-12-17 VITALS
TEMPERATURE: 98 F | HEART RATE: 102 BPM | SYSTOLIC BLOOD PRESSURE: 141 MMHG | BODY MASS INDEX: 30.73 KG/M2 | DIASTOLIC BLOOD PRESSURE: 82 MMHG | HEIGHT: 64 IN | WEIGHT: 180 LBS | OXYGEN SATURATION: 93 %

## 2019-12-17 PROCEDURE — 99213 OFFICE O/P EST LOW 20 MIN: CPT

## 2019-12-17 NOTE — ASSESSMENT
[FreeTextEntry1] : 84-year-old female with noted PMH including Crohn's disease, asthma, and BrCA s/p mastectomy, who presents for follow-up visit. She has preserved EF by echo 09/2019.  \par \par 1. Chest pressure: \par -Resolved at this point, will continue to monitor for now. \par \par 2. Dyspnea: Feels better since she was started on steroids. EF preserved by echo 09/2019. \par \par 3.HLD: Patient is out of benefit range of statin for primary prevention due to age > 75\par -She has routine blood work followed by her PMD\par \par 4. Elevated BP: Possibly in setting of steroid use, patient's BP has been normal at prior visits and there is no LVH on recent echocardiogram. Will re-evaluate BP at next visit.\par \par
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
## 2019-12-27 ENCOUNTER — APPOINTMENT (OUTPATIENT)
Dept: ORTHOPEDIC SURGERY | Facility: CLINIC | Age: 84
End: 2019-12-27
Payer: COMMERCIAL

## 2019-12-27 VITALS
DIASTOLIC BLOOD PRESSURE: 86 MMHG | BODY MASS INDEX: 30.73 KG/M2 | HEART RATE: 98 BPM | SYSTOLIC BLOOD PRESSURE: 146 MMHG | HEIGHT: 64 IN | WEIGHT: 180 LBS

## 2019-12-27 DIAGNOSIS — M25.561 PAIN IN RIGHT KNEE: ICD-10-CM

## 2019-12-27 DIAGNOSIS — S89.91XA UNSPECIFIED INJURY OF RIGHT LOWER LEG, INITIAL ENCOUNTER: ICD-10-CM

## 2019-12-27 DIAGNOSIS — M25.569 PAIN IN UNSPECIFIED KNEE: ICD-10-CM

## 2019-12-27 PROCEDURE — 73564 X-RAY EXAM KNEE 4 OR MORE: CPT | Mod: RT

## 2019-12-27 PROCEDURE — 73610 X-RAY EXAM OF ANKLE: CPT | Mod: RT

## 2019-12-27 PROCEDURE — 99204 OFFICE O/P NEW MOD 45 MIN: CPT

## 2019-12-27 PROCEDURE — 73590 X-RAY EXAM OF LOWER LEG: CPT | Mod: RT

## 2019-12-29 PROBLEM — M25.569 KNEE PAIN: Status: ACTIVE | Noted: 2019-12-27

## 2019-12-29 PROBLEM — M25.561 ACUTE PAIN OF RIGHT KNEE: Status: ACTIVE | Noted: 2019-12-27

## 2019-12-29 PROBLEM — S89.91XA RIGHT KNEE INJURY, INITIAL ENCOUNTER: Status: ACTIVE | Noted: 2019-12-29

## 2020-01-13 ENCOUNTER — APPOINTMENT (OUTPATIENT)
Dept: ORTHOPEDIC SURGERY | Facility: CLINIC | Age: 85
End: 2020-01-13
Payer: COMMERCIAL

## 2020-01-13 VITALS
WEIGHT: 190 LBS | HEIGHT: 64 IN | HEART RATE: 98 BPM | SYSTOLIC BLOOD PRESSURE: 117 MMHG | BODY MASS INDEX: 32.44 KG/M2 | DIASTOLIC BLOOD PRESSURE: 83 MMHG

## 2020-01-13 PROCEDURE — 99214 OFFICE O/P EST MOD 30 MIN: CPT

## 2020-01-13 RX ORDER — MONTELUKAST 10 MG/1
10 TABLET, FILM COATED ORAL
Refills: 0 | Status: ACTIVE | COMMUNITY
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
## 2020-01-13 NOTE — PHYSICAL EXAM
[de-identified] : Physical Examination\par General: well nourished, in no acute distress, alert and oriented to person, place and time\par Psychiatric: normal mood and affect, no abnormal movements or speech patterns\par Eyes: vision intact - glasses\par Throat: no thyromegaly\par Lymph: no enlarged nodes, no lymphedema in extremity\par Respiratory: no wheezing, no shortness of breath with ambulation\par Cardiac: no cardiac leg swelling, 2+ peripheral pulses\par Neurology: normal gross sensation in extremities to light touch\par Abdomen: soft, non-tender, tympanic, no masses\par \par Musculoskeletal Examination\par Ambulation	+ antalgic gait, - assistive devices\par \par Ankle			Right			Left\par General\par 	Deformity       	mild swelling			normal	\par 	Skin/Edema   	normal			normal\par 	Erythema       	-			-\par 	Alignment      	neutral			neutral\par Range of Motion\par 	Ankle Dorsiflex	10			20\par 	Ankle Plantarflex	35			45\par 	Inversion        	5			15\par 	Eversion		5			5\par Drawer\par 	ATFL		-			-\par 	CFL	                	-			-\par 	PTFL		-			-\par Palpation\par 	ATFL		-			-\par 	Medial Heel   	-			-\par 	Other		diffuse tenderness lateral and medial malleoli			-\par Strength Examination/Atrophy\par 	EHL 		4+			5+\par 	FHL 		4+			5+\par 	Tibialis Anterior	4+			5+\par 	Achilles/Soleus	4+			5+\par Sensation\par 	Deep Peroneal	normal			normal\par 	Super Peroneal 	normal			normal\par 	Sural 		normal			normal\par 	Posterior Tibial 	normal			normal\par 	Saphenous    	normal			normal\par Pulses\par 	DP   		2+			2+\par \par \par  [de-identified] : 3 views of the affected RIGHT ankle, (AP, Oblique and Lateral)\par Dated 12-27-19 evaluated by myself today and\par demonstrate:\par [Is] intact Mortise\par [Normal] Talus contour [without] cysts\par - osteophtes\par + Os Trigonum\par + achilles and plantar spurring\par [No] soft tissue calcification\par lucency of the lateral malleolus possibly due to osteopenia

## 2020-01-13 NOTE — DISCUSSION/SUMMARY
[de-identified] : Right ankle contusion lateral and medial malleoli\par \par I discussed my findings and history exam and radiology\par \par Patient is failed nonoperative modalities to date complaining of continued pain at the medial lateral aspects of the ankle\par \par Recommend CAM boot immobilization for 3 weeks\par \par The patient was prescribed Diclofenac topical liquid/creme non-steroidal anti-inflammatory medication. 1-2 pumps twice daily and apply to area with pain. There is low systemic absorption of the medication but risks while reduced remain were discussed and include but not limited to renal damage and GI ulceration and bleeding. They were warned to stop the medication if worsening buring skin or gastric pain or dizziness or other side effects. Also to immediately stop the medication and seek appropriate medical attention if any severe stomach ache, gastritis, black/red vomit, black/red stools or any other medical concern.\par \par Ice elevate heat compression\par \par MRI of the right ankle\par \par Followup after completion of MRI

## 2020-01-31 ENCOUNTER — FORM ENCOUNTER (OUTPATIENT)
Age: 85
End: 2020-01-31

## 2020-02-01 ENCOUNTER — APPOINTMENT (OUTPATIENT)
Dept: MRI IMAGING | Facility: HOSPITAL | Age: 85
End: 2020-02-01
Payer: COMMERCIAL

## 2020-02-01 ENCOUNTER — OUTPATIENT (OUTPATIENT)
Dept: OUTPATIENT SERVICES | Facility: HOSPITAL | Age: 85
LOS: 1 days | End: 2020-02-01
Payer: COMMERCIAL

## 2020-02-01 DIAGNOSIS — S99.911A UNSPECIFIED INJURY OF RIGHT ANKLE, INITIAL ENCOUNTER: ICD-10-CM

## 2020-02-01 PROCEDURE — 73721 MRI JNT OF LWR EXTRE W/O DYE: CPT

## 2020-02-01 PROCEDURE — 73721 MRI JNT OF LWR EXTRE W/O DYE: CPT | Mod: 26,RT

## 2020-02-14 ENCOUNTER — APPOINTMENT (OUTPATIENT)
Dept: ORTHOPEDIC SURGERY | Facility: CLINIC | Age: 85
End: 2020-02-14

## 2020-02-21 ENCOUNTER — APPOINTMENT (OUTPATIENT)
Dept: ORTHOPEDIC SURGERY | Facility: CLINIC | Age: 85
End: 2020-02-21
Payer: COMMERCIAL

## 2020-02-21 DIAGNOSIS — M25.572 PAIN IN LEFT ANKLE AND JOINTS OF LEFT FOOT: ICD-10-CM

## 2020-02-21 DIAGNOSIS — S93.491A SPRAIN OF OTHER LIGAMENT OF RIGHT ANKLE, INITIAL ENCOUNTER: ICD-10-CM

## 2020-02-21 PROCEDURE — 99214 OFFICE O/P EST MOD 30 MIN: CPT

## 2020-02-21 NOTE — PHYSICAL EXAM
[de-identified] : Physical Examination\par General: well nourished, in no acute distress, alert and oriented to person, place and time\par Psychiatric: normal mood and affect, no abnormal movements or speech patterns\par Eyes: vision intact - glasses\par Throat: no thyromegaly\par Lymph: no enlarged nodes, no lymphedema in extremity\par Respiratory: no wheezing, no shortness of breath with ambulation\par Cardiac: no cardiac leg swelling, 2+ peripheral pulses\par Neurology: normal gross sensation in extremities to light touch\par Abdomen: soft, non-tender, tympanic, no masses\par \par Musculoskeletal Examination\par Ambulation	+ antalgic gait, - assistive devices\par \par Ankle			Right			Left\par General\par 	Deformity       	mild swelling			normal	\par 	Skin/Edema   	normal			normal\par 	Erythema       	-			-\par 	Alignment      	neutral			neutral\par Range of Motion\par 	Ankle Dorsiflex	10			15\par 	Ankle Plantarflex	35			40\par 	Inversion        	5			15\par 	Eversion		5			5\par Drawer\par 	ATFL		-			-\par 	CFL	                	-			-\par 	PTFL		-			-\par Palpation\par 	ATFL		-			-\par 	Medial Heel   	-			-\par 	Other		posterior peroneal			posterior peroneal\par Strength Examination/Atrophy\par 	EHL 		4+			5+\par 	FHL 		4+			5+\par 	Tibialis Anterior	4+			5+\par 	Achilles/Soleus	4+			5+\par Sensation\par 	Deep Peroneal	normal			normal\par 	Super Peroneal 	normal			normal\par 	Sural 		normal			normal\par 	Posterior Tibial 	normal			normal\par 	Saphenous    	normal			normal\par Pulses\par 	DP   		2+			2+\par \par \par  [de-identified] : 3 views of the affected RIGHT ankle, (AP, Oblique and Lateral)\par Dated 12-27-19\par demonstrate:\par [Is] intact Mortise\par [Normal] Talus contour [without] cysts\par - osteophtes\par + Os Trigonum\par + achilles and plantar spurring\par [No] soft tissue calcification\par lucency of the lateral malleolus possibly due to osteopenia\par \par MRI of the R ankle from 2-3-20 at [facility]\par My impression of the images:\par thickened atfl in continuity\par no peroneal tendon tear\par low lying peroneal muscle belly\par fluid around the FHL\par \par \par The Final Radiologist Impression:\par \par MUSCLES AND TENDONS: The tendons are preserved. No full-thickness tear or \par retraction is seen. There is fatty atrophy of the abductor digiti minimi. \par The remaining musculature is preserved. \par PLANTAR FASCIA: Preserved \par LIGAMENTS: Moderate to severe attenuation of an otherwise intact ATFL. The \par CFL is preserved. The deltoid ligament is intact. \par CARTILAGE and SUBCHONDRAL BONE: The tibiotalar, posterior subtalar, middle \par subtalar, calcaneocuboid, and talonavicular joints are intact. The midfoot \par joint spaces are preserved. \par SYNOVIUM/JOINT FLUID: There is no joint effusion. \par MARROW: No fracture. No reactive bone marrow edema is identified. \par NEUROVASCULAR STRUCTURES: Preserved \par SINUS TARSI: Preserved \par PERIPHERAL/ SUB-CUTANEOUS SOFT TISSUES: There is no soft tissue swelling. \par \par IMPRESSION: \par Remote ankle sprain. \par
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
## 2020-02-21 NOTE — DISCUSSION/SUMMARY
[de-identified] : Right ankle contusion lateral and medial malleoli improving\par Right ankle atfl remote strain\par Right ankle peroneal tendonitis\par left ankle peroneal pain\par \par I discussed my findings and history exam and radiology\par \par Patient is failed nonoperative modalities to date complaining of continued pain at the medial lateral aspects of the ankle\par \par Recommend CAM boot decrease and cease use in 2 weeks\par \par continue prescribed Diclofenac topical liquid/creme non-steroidal anti-inflammatory medication. 1-2 pumps twice daily and apply to area with pain. There is low systemic absorption of the medication but risks while reduced remain were discussed and include but not limited to renal damage and GI ulceration and bleeding. They were warned to stop the medication if worsening buring skin or gastric pain or dizziness or other side effects. Also to immediately stop the medication and seek appropriate medical attention if any severe stomach ache, gastritis, black/red vomit, black/red stools or any other medical concern.\par \par Ice elevate heat compression\par \par PT\par \par Followup prn after PT

## 2020-03-04 ENCOUNTER — APPOINTMENT (OUTPATIENT)
Dept: CARDIOLOGY | Facility: CLINIC | Age: 85
End: 2020-03-04

## 2020-03-11 ENCOUNTER — INPATIENT (INPATIENT)
Facility: HOSPITAL | Age: 85
LOS: 2 days | Discharge: NURSING, REHAB OR LONG-TERM CARE | DRG: 563 | End: 2020-03-14
Attending: HOSPITALIST | Admitting: HOSPITALIST
Payer: MEDICARE

## 2020-03-11 VITALS
HEART RATE: 77 BPM | OXYGEN SATURATION: 96 % | WEIGHT: 179.9 LBS | HEIGHT: 65 IN | DIASTOLIC BLOOD PRESSURE: 81 MMHG | SYSTOLIC BLOOD PRESSURE: 153 MMHG | RESPIRATION RATE: 19 BRPM | TEMPERATURE: 99 F
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
## 2020-03-11 DIAGNOSIS — K51.90 ULCERATIVE COLITIS, UNSPECIFIED, WITHOUT COMPLICATIONS: ICD-10-CM

## 2020-03-11 DIAGNOSIS — S42.309A UNSPECIFIED FRACTURE OF SHAFT OF HUMERUS, UNSPECIFIED ARM, INITIAL ENCOUNTER FOR CLOSED FRACTURE: ICD-10-CM

## 2020-03-11 DIAGNOSIS — R42 DIZZINESS AND GIDDINESS: ICD-10-CM

## 2020-03-11 DIAGNOSIS — Z90.13 ACQUIRED ABSENCE OF BILATERAL BREASTS AND NIPPLES: Chronic | ICD-10-CM

## 2020-03-11 DIAGNOSIS — Z29.9 ENCOUNTER FOR PROPHYLACTIC MEASURES, UNSPECIFIED: ICD-10-CM

## 2020-03-11 DIAGNOSIS — M81.0 AGE-RELATED OSTEOPOROSIS WITHOUT CURRENT PATHOLOGICAL FRACTURE: ICD-10-CM

## 2020-03-11 DIAGNOSIS — R52 PAIN, UNSPECIFIED: ICD-10-CM

## 2020-03-11 DIAGNOSIS — K21.9 GASTRO-ESOPHAGEAL REFLUX DISEASE WITHOUT ESOPHAGITIS: ICD-10-CM

## 2020-03-11 LAB
24R-OH-CALCIDIOL SERPL-MCNC: 28.5 NG/ML — LOW (ref 30–80)
ALBUMIN SERPL ELPH-MCNC: 3.6 G/DL — SIGNIFICANT CHANGE UP (ref 3.5–5)
ALP SERPL-CCNC: 67 U/L — SIGNIFICANT CHANGE UP (ref 40–120)
ALT FLD-CCNC: 52 U/L DA — SIGNIFICANT CHANGE UP (ref 10–60)
ANION GAP SERPL CALC-SCNC: 6 MMOL/L — SIGNIFICANT CHANGE UP (ref 5–17)
ANION GAP SERPL CALC-SCNC: 6 MMOL/L — SIGNIFICANT CHANGE UP (ref 5–17)
APTT BLD: 27.7 SEC — SIGNIFICANT CHANGE UP (ref 27.5–36.3)
AST SERPL-CCNC: 35 U/L — SIGNIFICANT CHANGE UP (ref 10–40)
BASOPHILS # BLD AUTO: 0.02 K/UL — SIGNIFICANT CHANGE UP (ref 0–0.2)
BASOPHILS # BLD AUTO: 0.02 K/UL — SIGNIFICANT CHANGE UP (ref 0–0.2)
BASOPHILS NFR BLD AUTO: 0.2 % — SIGNIFICANT CHANGE UP (ref 0–2)
BASOPHILS NFR BLD AUTO: 0.2 % — SIGNIFICANT CHANGE UP (ref 0–2)
BILIRUB SERPL-MCNC: 0.5 MG/DL — SIGNIFICANT CHANGE UP (ref 0.2–1.2)
BUN SERPL-MCNC: 25 MG/DL — HIGH (ref 7–18)
BUN SERPL-MCNC: 28 MG/DL — HIGH (ref 7–18)
CALCIUM SERPL-MCNC: 8.8 MG/DL — SIGNIFICANT CHANGE UP (ref 8.4–10.5)
CALCIUM SERPL-MCNC: 9.4 MG/DL — SIGNIFICANT CHANGE UP (ref 8.4–10.5)
CHLORIDE SERPL-SCNC: 106 MMOL/L — SIGNIFICANT CHANGE UP (ref 96–108)
CHLORIDE SERPL-SCNC: 109 MMOL/L — HIGH (ref 96–108)
CHOLEST SERPL-MCNC: 233 MG/DL — HIGH (ref 10–199)
CO2 SERPL-SCNC: 25 MMOL/L — SIGNIFICANT CHANGE UP (ref 22–31)
CO2 SERPL-SCNC: 27 MMOL/L — SIGNIFICANT CHANGE UP (ref 22–31)
CREAT SERPL-MCNC: 0.79 MG/DL — SIGNIFICANT CHANGE UP (ref 0.5–1.3)
CREAT SERPL-MCNC: 0.95 MG/DL — SIGNIFICANT CHANGE UP (ref 0.5–1.3)
EOSINOPHIL # BLD AUTO: 0.01 K/UL — SIGNIFICANT CHANGE UP (ref 0–0.5)
EOSINOPHIL # BLD AUTO: 0.04 K/UL — SIGNIFICANT CHANGE UP (ref 0–0.5)
EOSINOPHIL NFR BLD AUTO: 0.1 % — SIGNIFICANT CHANGE UP (ref 0–6)
EOSINOPHIL NFR BLD AUTO: 0.4 % — SIGNIFICANT CHANGE UP (ref 0–6)
ERYTHROCYTE [SEDIMENTATION RATE] IN BLOOD: 7 MM/HR — SIGNIFICANT CHANGE UP (ref 0–20)
FOLATE SERPL-MCNC: >20 NG/ML — SIGNIFICANT CHANGE UP
GLUCOSE BLDC GLUCOMTR-MCNC: 104 MG/DL — HIGH (ref 70–99)
GLUCOSE BLDC GLUCOMTR-MCNC: 110 MG/DL — HIGH (ref 70–99)
GLUCOSE SERPL-MCNC: 129 MG/DL — HIGH (ref 70–99)
GLUCOSE SERPL-MCNC: 146 MG/DL — HIGH (ref 70–99)
HBA1C BLD-MCNC: 6.3 % — HIGH (ref 4–5.6)
HCT VFR BLD CALC: 39.6 % — SIGNIFICANT CHANGE UP (ref 34.5–45)
HCT VFR BLD CALC: 44.2 % — SIGNIFICANT CHANGE UP (ref 34.5–45)
HDLC SERPL-MCNC: 72 MG/DL — SIGNIFICANT CHANGE UP
HGB BLD-MCNC: 13.3 G/DL — SIGNIFICANT CHANGE UP (ref 11.5–15.5)
HGB BLD-MCNC: 14.6 G/DL — SIGNIFICANT CHANGE UP (ref 11.5–15.5)
IMM GRANULOCYTES NFR BLD AUTO: 0.5 % — SIGNIFICANT CHANGE UP (ref 0–1.5)
IMM GRANULOCYTES NFR BLD AUTO: 0.5 % — SIGNIFICANT CHANGE UP (ref 0–1.5)
INR BLD: 1.02 RATIO — SIGNIFICANT CHANGE UP (ref 0.88–1.16)
INR BLD: 1.09 RATIO — SIGNIFICANT CHANGE UP (ref 0.88–1.16)
LIPID PNL WITH DIRECT LDL SERPL: 148 MG/DL — SIGNIFICANT CHANGE UP
LYMPHOCYTES # BLD AUTO: 1.17 K/UL — SIGNIFICANT CHANGE UP (ref 1–3.3)
LYMPHOCYTES # BLD AUTO: 2.05 K/UL — SIGNIFICANT CHANGE UP (ref 1–3.3)
LYMPHOCYTES # BLD AUTO: 20.7 % — SIGNIFICANT CHANGE UP (ref 13–44)
LYMPHOCYTES # BLD AUTO: 9.7 % — LOW (ref 13–44)
MAGNESIUM SERPL-MCNC: 2 MG/DL — SIGNIFICANT CHANGE UP (ref 1.6–2.6)
MCHC RBC-ENTMCNC: 31.8 PG — SIGNIFICANT CHANGE UP (ref 27–34)
MCHC RBC-ENTMCNC: 31.9 PG — SIGNIFICANT CHANGE UP (ref 27–34)
MCHC RBC-ENTMCNC: 33 GM/DL — SIGNIFICANT CHANGE UP (ref 32–36)
MCHC RBC-ENTMCNC: 33.6 GM/DL — SIGNIFICANT CHANGE UP (ref 32–36)
MCV RBC AUTO: 95 FL — SIGNIFICANT CHANGE UP (ref 80–100)
MCV RBC AUTO: 96.3 FL — SIGNIFICANT CHANGE UP (ref 80–100)
MONOCYTES # BLD AUTO: 0.94 K/UL — HIGH (ref 0–0.9)
MONOCYTES # BLD AUTO: 1.01 K/UL — HIGH (ref 0–0.9)
MONOCYTES NFR BLD AUTO: 10.2 % — SIGNIFICANT CHANGE UP (ref 2–14)
MONOCYTES NFR BLD AUTO: 7.8 % — SIGNIFICANT CHANGE UP (ref 2–14)
NEUTROPHILS # BLD AUTO: 6.75 K/UL — SIGNIFICANT CHANGE UP (ref 1.8–7.4)
NEUTROPHILS # BLD AUTO: 9.84 K/UL — HIGH (ref 1.8–7.4)
NEUTROPHILS NFR BLD AUTO: 68 % — SIGNIFICANT CHANGE UP (ref 43–77)
NEUTROPHILS NFR BLD AUTO: 81.7 % — HIGH (ref 43–77)
NRBC # BLD: 0 /100 WBCS — SIGNIFICANT CHANGE UP (ref 0–0)
NRBC # BLD: 0 /100 WBCS — SIGNIFICANT CHANGE UP (ref 0–0)
PHOSPHATE SERPL-MCNC: 3.3 MG/DL — SIGNIFICANT CHANGE UP (ref 2.5–4.5)
PLATELET # BLD AUTO: 209 K/UL — SIGNIFICANT CHANGE UP (ref 150–400)
PLATELET # BLD AUTO: 235 K/UL — SIGNIFICANT CHANGE UP (ref 150–400)
POTASSIUM SERPL-MCNC: 3.5 MMOL/L — SIGNIFICANT CHANGE UP (ref 3.5–5.3)
POTASSIUM SERPL-MCNC: 4 MMOL/L — SIGNIFICANT CHANGE UP (ref 3.5–5.3)
POTASSIUM SERPL-SCNC: 3.5 MMOL/L — SIGNIFICANT CHANGE UP (ref 3.5–5.3)
POTASSIUM SERPL-SCNC: 4 MMOL/L — SIGNIFICANT CHANGE UP (ref 3.5–5.3)
PROT SERPL-MCNC: 7 G/DL — SIGNIFICANT CHANGE UP (ref 6–8.3)
PROTHROM AB SERPL-ACNC: 11.3 SEC — SIGNIFICANT CHANGE UP (ref 10–12.9)
PROTHROM AB SERPL-ACNC: 12.4 SEC — SIGNIFICANT CHANGE UP (ref 10–12.9)
RBC # BLD: 4.17 M/UL — SIGNIFICANT CHANGE UP (ref 3.8–5.2)
RBC # BLD: 4.59 M/UL — SIGNIFICANT CHANGE UP (ref 3.8–5.2)
RBC # FLD: 14 % — SIGNIFICANT CHANGE UP (ref 10.3–14.5)
RBC # FLD: 14.2 % — SIGNIFICANT CHANGE UP (ref 10.3–14.5)
SODIUM SERPL-SCNC: 139 MMOL/L — SIGNIFICANT CHANGE UP (ref 135–145)
SODIUM SERPL-SCNC: 140 MMOL/L — SIGNIFICANT CHANGE UP (ref 135–145)
TOTAL CHOLESTEROL/HDL RATIO MEASUREMENT: 3.2 RATIO — LOW (ref 3.3–7.1)
TRIGL SERPL-MCNC: 67 MG/DL — SIGNIFICANT CHANGE UP (ref 10–149)
TSH SERPL-MCNC: 1.28 UU/ML — SIGNIFICANT CHANGE UP (ref 0.34–4.82)
VIT B12 SERPL-MCNC: >2000 PG/ML — HIGH (ref 232–1245)
WBC # BLD: 12.04 K/UL — HIGH (ref 3.8–10.5)
WBC # BLD: 9.92 K/UL — SIGNIFICANT CHANGE UP (ref 3.8–10.5)
WBC # FLD AUTO: 12.04 K/UL — HIGH (ref 3.8–10.5)
WBC # FLD AUTO: 9.92 K/UL — SIGNIFICANT CHANGE UP (ref 3.8–10.5)

## 2020-03-11 PROCEDURE — 73502 X-RAY EXAM HIP UNI 2-3 VIEWS: CPT | Mod: 26,RT

## 2020-03-11 PROCEDURE — 99221 1ST HOSP IP/OBS SF/LOW 40: CPT

## 2020-03-11 PROCEDURE — 71045 X-RAY EXAM CHEST 1 VIEW: CPT | Mod: 26

## 2020-03-11 PROCEDURE — 73030 X-RAY EXAM OF SHOULDER: CPT | Mod: 26,RT

## 2020-03-11 PROCEDURE — 99223 1ST HOSP IP/OBS HIGH 75: CPT

## 2020-03-11 PROCEDURE — 99285 EMERGENCY DEPT VISIT HI MDM: CPT

## 2020-03-11 RX ORDER — LIDOCAINE 4 G/100G
1 CREAM TOPICAL DAILY
Refills: 0 | Status: DISCONTINUED | OUTPATIENT
Start: 2020-03-11 | End: 2020-03-14

## 2020-03-11 RX ORDER — MONTELUKAST 4 MG/1
10 TABLET, CHEWABLE ORAL DAILY
Refills: 0 | Status: DISCONTINUED | OUTPATIENT
Start: 2020-03-11 | End: 2020-03-14

## 2020-03-11 RX ORDER — MESALAMINE 400 MG
800 TABLET, DELAYED RELEASE (ENTERIC COATED) ORAL
Refills: 0 | Status: DISCONTINUED | OUTPATIENT
Start: 2020-03-11 | End: 2020-03-11

## 2020-03-11 RX ORDER — HEPARIN SODIUM 5000 [USP'U]/ML
5000 INJECTION INTRAVENOUS; SUBCUTANEOUS EVERY 8 HOURS
Refills: 0 | Status: DISCONTINUED | OUTPATIENT
Start: 2020-03-11 | End: 2020-03-14

## 2020-03-11 RX ORDER — MORPHINE SULFATE 50 MG/1
4 CAPSULE, EXTENDED RELEASE ORAL ONCE
Refills: 0 | Status: DISCONTINUED | OUTPATIENT
Start: 2020-03-11 | End: 2020-03-11

## 2020-03-11 RX ORDER — IPRATROPIUM/ALBUTEROL SULFATE 18-103MCG
3 AEROSOL WITH ADAPTER (GRAM) INHALATION EVERY 6 HOURS
Refills: 0 | Status: DISCONTINUED | OUTPATIENT
Start: 2020-03-11 | End: 2020-03-12

## 2020-03-11 RX ORDER — HYDROMORPHONE HYDROCHLORIDE 2 MG/ML
1 INJECTION INTRAMUSCULAR; INTRAVENOUS; SUBCUTANEOUS EVERY 4 HOURS
Refills: 0 | Status: DISCONTINUED | OUTPATIENT
Start: 2020-03-11 | End: 2020-03-14

## 2020-03-11 RX ORDER — OXYCODONE HYDROCHLORIDE 5 MG/1
10 TABLET ORAL EVERY 4 HOURS
Refills: 0 | Status: DISCONTINUED | OUTPATIENT
Start: 2020-03-11 | End: 2020-03-12

## 2020-03-11 RX ORDER — MORPHINE SULFATE 50 MG/1
1 CAPSULE, EXTENDED RELEASE ORAL EVERY 6 HOURS
Refills: 0 | Status: DISCONTINUED | OUTPATIENT
Start: 2020-03-11 | End: 2020-03-11

## 2020-03-11 RX ORDER — INSULIN LISPRO 100/ML
VIAL (ML) SUBCUTANEOUS
Refills: 0 | Status: DISCONTINUED | OUTPATIENT
Start: 2020-03-11 | End: 2020-03-14

## 2020-03-11 RX ORDER — SODIUM CHLORIDE 9 MG/ML
1000 INJECTION, SOLUTION INTRAVENOUS
Refills: 0 | Status: DISCONTINUED | OUTPATIENT
Start: 2020-03-11 | End: 2020-03-14

## 2020-03-11 RX ORDER — ALBUTEROL 90 UG/1
1 AEROSOL, METERED ORAL EVERY 4 HOURS
Refills: 0 | Status: DISCONTINUED | OUTPATIENT
Start: 2020-03-11 | End: 2020-03-13

## 2020-03-11 RX ORDER — MESALAMINE 400 MG
1600 TABLET, DELAYED RELEASE (ENTERIC COATED) ORAL
Refills: 0 | Status: DISCONTINUED | OUTPATIENT
Start: 2020-03-11 | End: 2020-03-14

## 2020-03-11 RX ORDER — ALBUTEROL 90 UG/1
4 AEROSOL, METERED ORAL ONCE
Refills: 0 | Status: COMPLETED | OUTPATIENT
Start: 2020-03-11 | End: 2020-03-11

## 2020-03-11 RX ORDER — ACETAMINOPHEN 500 MG
650 TABLET ORAL ONCE
Refills: 0 | Status: COMPLETED | OUTPATIENT
Start: 2020-03-11 | End: 2020-03-11

## 2020-03-11 RX ORDER — LETROZOLE 2.5 MG/1
2.5 TABLET, FILM COATED ORAL DAILY
Refills: 0 | Status: DISCONTINUED | OUTPATIENT
Start: 2020-03-11 | End: 2020-03-14

## 2020-03-11 RX ORDER — MECLIZINE HCL 12.5 MG
25 TABLET ORAL DAILY
Refills: 0 | Status: DISCONTINUED | OUTPATIENT
Start: 2020-03-11 | End: 2020-03-14

## 2020-03-11 RX ORDER — TIOTROPIUM BROMIDE 18 UG/1
1 CAPSULE ORAL; RESPIRATORY (INHALATION) DAILY
Refills: 0 | Status: DISCONTINUED | OUTPATIENT
Start: 2020-03-11 | End: 2020-03-13

## 2020-03-11 RX ORDER — ACETAMINOPHEN 500 MG
650 TABLET ORAL EVERY 6 HOURS
Refills: 0 | Status: DISCONTINUED | OUTPATIENT
Start: 2020-03-11 | End: 2020-03-14

## 2020-03-11 RX ADMIN — MORPHINE SULFATE 4 MILLIGRAM(S): 50 CAPSULE, EXTENDED RELEASE ORAL at 03:56

## 2020-03-11 RX ADMIN — HEPARIN SODIUM 5000 UNIT(S): 5000 INJECTION INTRAVENOUS; SUBCUTANEOUS at 13:31

## 2020-03-11 RX ADMIN — HYDROMORPHONE HYDROCHLORIDE 1 MILLIGRAM(S): 2 INJECTION INTRAMUSCULAR; INTRAVENOUS; SUBCUTANEOUS at 13:31

## 2020-03-11 RX ADMIN — HYDROMORPHONE HYDROCHLORIDE 1 MILLIGRAM(S): 2 INJECTION INTRAMUSCULAR; INTRAVENOUS; SUBCUTANEOUS at 21:58

## 2020-03-11 RX ADMIN — HYDROMORPHONE HYDROCHLORIDE 1 MILLIGRAM(S): 2 INJECTION INTRAMUSCULAR; INTRAVENOUS; SUBCUTANEOUS at 17:23

## 2020-03-11 RX ADMIN — OXYCODONE HYDROCHLORIDE 10 MILLIGRAM(S): 5 TABLET ORAL at 09:40

## 2020-03-11 RX ADMIN — ALBUTEROL 4 PUFF(S): 90 AEROSOL, METERED ORAL at 03:57

## 2020-03-11 RX ADMIN — Medication 500 MILLIGRAM(S): at 03:59

## 2020-03-11 RX ADMIN — SODIUM CHLORIDE 75 MILLILITER(S): 9 INJECTION, SOLUTION INTRAVENOUS at 05:37

## 2020-03-11 RX ADMIN — LIDOCAINE 1 PATCH: 4 CREAM TOPICAL at 17:28

## 2020-03-11 RX ADMIN — HYDROMORPHONE HYDROCHLORIDE 1 MILLIGRAM(S): 2 INJECTION INTRAMUSCULAR; INTRAVENOUS; SUBCUTANEOUS at 22:55

## 2020-03-11 RX ADMIN — OXYCODONE HYDROCHLORIDE 10 MILLIGRAM(S): 5 TABLET ORAL at 11:01

## 2020-03-11 RX ADMIN — HEPARIN SODIUM 5000 UNIT(S): 5000 INJECTION INTRAVENOUS; SUBCUTANEOUS at 21:37

## 2020-03-11 RX ADMIN — Medication 3 MILLILITER(S): at 15:01

## 2020-03-11 RX ADMIN — Medication 3 MILLILITER(S): at 20:58

## 2020-03-11 RX ADMIN — LIDOCAINE 1 PATCH: 4 CREAM TOPICAL at 05:32

## 2020-03-11 RX ADMIN — HYDROMORPHONE HYDROCHLORIDE 1 MILLIGRAM(S): 2 INJECTION INTRAMUSCULAR; INTRAVENOUS; SUBCUTANEOUS at 18:03

## 2020-03-11 RX ADMIN — SODIUM CHLORIDE 75 MILLILITER(S): 9 INJECTION, SOLUTION INTRAVENOUS at 10:22

## 2020-03-11 RX ADMIN — HEPARIN SODIUM 5000 UNIT(S): 5000 INJECTION INTRAVENOUS; SUBCUTANEOUS at 05:31

## 2020-03-11 RX ADMIN — LIDOCAINE 1 PATCH: 4 CREAM TOPICAL at 05:31

## 2020-03-11 RX ADMIN — HYDROMORPHONE HYDROCHLORIDE 1 MILLIGRAM(S): 2 INJECTION INTRAMUSCULAR; INTRAVENOUS; SUBCUTANEOUS at 08:21

## 2020-03-11 RX ADMIN — LETROZOLE 2.5 MILLIGRAM(S): 2.5 TABLET, FILM COATED ORAL at 12:14

## 2020-03-11 RX ADMIN — Medication 650 MILLIGRAM(S): at 03:59

## 2020-03-11 RX ADMIN — Medication 500 MILLIGRAM(S): at 03:57

## 2020-03-11 RX ADMIN — MONTELUKAST 10 MILLIGRAM(S): 4 TABLET, CHEWABLE ORAL at 11:35

## 2020-03-11 RX ADMIN — MORPHINE SULFATE 4 MILLIGRAM(S): 50 CAPSULE, EXTENDED RELEASE ORAL at 05:39

## 2020-03-11 RX ADMIN — Medication 650 MILLIGRAM(S): at 03:56

## 2020-03-11 RX ADMIN — MORPHINE SULFATE 4 MILLIGRAM(S): 50 CAPSULE, EXTENDED RELEASE ORAL at 03:59

## 2020-03-11 RX ADMIN — HYDROMORPHONE HYDROCHLORIDE 1 MILLIGRAM(S): 2 INJECTION INTRAMUSCULAR; INTRAVENOUS; SUBCUTANEOUS at 15:02

## 2020-03-11 RX ADMIN — Medication 5 MILLIGRAM(S): at 11:42

## 2020-03-11 RX ADMIN — MORPHINE SULFATE 4 MILLIGRAM(S): 50 CAPSULE, EXTENDED RELEASE ORAL at 05:40

## 2020-03-11 RX ADMIN — HYDROMORPHONE HYDROCHLORIDE 1 MILLIGRAM(S): 2 INJECTION INTRAMUSCULAR; INTRAVENOUS; SUBCUTANEOUS at 08:40

## 2020-03-11 NOTE — H&P ADULT - NSHPREVIEWOFSYSTEMS_GEN_ALL_CORE
REVIEW OF SYSTEMS:    CONSTITUTIONAL: No weakness, fevers or chills  EYES/ENT: No visual changes;  No vertigo or throat pain   NECK: No pain or stiffness  RESPIRATORY: No cough, wheezing, hemoptysis; No shortness of breath  CARDIOVASCULAR: No chest pain or palpitations  GASTROINTESTINAL: No abdominal or epigastric pain. No nausea, vomiting, or hematemesis; No diarrhea or constipation. No melena or hematochezia.  GENITOURINARY: No dysuria, frequency or hematuria  MUSCULOSKELETAL RIGHT ARM PAIN  NEUROLOGICAL: No numbness or weakness  SKIN: No itching, burning, rashes, or lesions   All other review of systems is negative unless indicated above. REVIEW OF SYSTEMS:    CONSTITUTIONAL: No weakness, fevers or chills  EYES/ENT: No visual changes;  No vertigo or throat pain   NECK: No pain or stiffness  RESPIRATORY: No cough, wheezing, hemoptysis; No shortness of breath  CARDIOVASCULAR: No chest pain or palpitations  GASTROINTESTINAL: No abdominal or epigastric pain. No nausea, vomiting, or hematemesis; No diarrhea or constipation. No melena or hematochezia.  GENITOURINARY: No dysuria, frequency or hematuria  MUSCULOSKELETAL RIGHT ARM PAIN and RIGHT GROIN PAIN  NEUROLOGICAL: No numbness or weakness  SKIN: No itching, burning, rashes, or lesions   All other review of systems is negative unless indicated above.

## 2020-03-11 NOTE — ED ADULT TRIAGE NOTE - WEIGHT METHOD
"Orthopaedic Progress Note     LOS: 14 days   Patient Care Team:  Vincent Mccullough MD as PCP - General  Vincent Mccullough MD as PCP - Family Medicine    Chief Complaint:   Pneumonia due to infectious organism    Subjective     Interval History:    No new complaints this morning.    Objective     Vital Signs  Temp (24hrs), Av.7 °F (36.5 °C), Min:97.7 °F (36.5 °C), Max:97.7 °F (36.5 °C)      /71  Pulse 78  Temp 97.7 °F (36.5 °C) (Oral)   Resp 20  Ht 72\" (182.9 cm)  Wt 236 lb 12.8 oz (107 kg)  SpO2 96%  BMI 32.12 kg/m2    Physical Exam:   Examination the right elbow: Minimal swelling in the olecranon bursa, with mild erythema. Full range of motion of the elbow, with 0° of extension, 140° of flexion, and full pronation and supination.  Sensation and motor is intact in the hand.    Labs:    Results from last 7 days  Lab Units 17  0349   WBC 10*3/mm3 4.30   RBC 10*6/mm3 4.12*   HEMOGLOBIN g/dL 11.3*   HEMATOCRIT % 36.0*   PLATELETS 10*3/mm3 120*       PT:  Gait, Distance (Feet): 25     Results Review:     I reviewed the patient's new clinical results.      Assessment/Plan      Olecranon bursitis right elbow.  Cultures:Scant growth (1+) Corynebacterium species (A).  Currently on antibiotics.  Continue with conservative treatment.   I will sign off - but please feel free to contact me for any questions, problems or worsening.    Principal Problem:    Pneumonia due to infectious organism  Active Problems:    DM type 2 (diabetes mellitus, type 2)    COPD (chronic obstructive pulmonary disease)    Atrial fibrillation    Reactive airway disease with wheezing    Urinary retention    Olecranon bursitis of right elbow      Plan for disposition: Per hospitalist team.    Aly Gayle MD  17  8:17 AM  " stated

## 2020-03-11 NOTE — ED ADULT TRIAGE NOTE - SOURCE OF INFORMATION
Via Vijaya:    Tala Parson,    We would recommend you contact Vijaya for assistance with this. There should be a way for you to be his proxy.    Thank you,  [facility]   EMS
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
## 2020-03-11 NOTE — H&P ADULT - NSHPLABSRESULTS_GEN_ALL_CORE
LABS:                        14.6   12.04 )-----------( 235      ( 11 Mar 2020 04:06 )             44.2     03-11    140  |  109<H>  |  28<H>  ----------------------------<  146<H>  4.0   |  25  |  0.95    Ca    9.4      11 Mar 2020 04:06      PT/INR - ( 11 Mar 2020 04:06 )   PT: 11.3 sec;   INR: 1.02 ratio         PTT - ( 11 Mar 2020 04:06 )  PTT:27.7 sec
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
## 2020-03-11 NOTE — H&P ADULT - PROBLEM SELECTOR PLAN 1
pt had recurrent falls.  X ray showed Right humerus fracture  No surgical intervention as per discussion with Orthopedic Attending  Ortho consult Dr Fernandez
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
## 2020-03-11 NOTE — PATIENT PROFILE ADULT - NSASFUNCLEVELADLAMBULATE_GEN_A_NUR
Pt with a history of urinary symptoms coming in for follow up. No recent UA/UC's available. No need for a call.   2 = assistive person

## 2020-03-11 NOTE — H&P ADULT - NSHPPHYSICALEXAM_GEN_ALL_CORE
Vital Signs Last 24 Hrs  T(C): 37 (11 Mar 2020 02:51), Max: 37 (11 Mar 2020 02:51)  T(F): 98.6 (11 Mar 2020 02:51), Max: 98.6 (11 Mar 2020 02:51)  HR: 77 (11 Mar 2020 02:51) (77 - 77)  BP: 153/81 (11 Mar 2020 02:51) (153/81 - 153/81)  BP(mean): --  RR: 19 (11 Mar 2020 02:51) (19 - 19)  SpO2: 96% (11 Mar 2020 02:51) (96% - 96%)  PHYSICAL EXAM:  GENERAL: NAD, obese  HEAD:  Atraumatic, Normocephalic  EYES:  conjunctiva and sclera clear  NECK: Supple, No JVD, Normal thyroid  CHEST/LUNG: Clear to auscultation. Clear to percussion bilaterally; No rales, rhonchi, wheezing, or rubs  HEART: Regular rate and rhythm; No murmurs, rubs, or gallops  ABDOMEN: Soft, Nontender, Nondistended; Bowel sounds present  MUSCULOSKELETAL RIGHT ARM TENDERNESS IS PRESENT, LIMITED ROM, SLING IN PLACE  NERVOUS SYSTEM:  Alert & Oriented X3,    EXTREMITIES:  2+ Peripheral Pulses, No clubbing, cyanosis, or edema  SKIN: warm dry Vital Signs Last 24 Hrs  T(C): 37 (11 Mar 2020 02:51), Max: 37 (11 Mar 2020 02:51)  T(F): 98.6 (11 Mar 2020 02:51), Max: 98.6 (11 Mar 2020 02:51)  HR: 77 (11 Mar 2020 02:51) (77 - 77)  BP: 153/81 (11 Mar 2020 02:51) (153/81 - 153/81)  BP(mean): --  RR: 19 (11 Mar 2020 02:51) (19 - 19)  SpO2: 96% (11 Mar 2020 02:51) (96% - 96%)  PHYSICAL EXAM:  GENERAL: NAD, obese  HEAD:  Atraumatic, Normocephalic  EYES:  conjunctiva and sclera clear  NECK: Supple, No JVD, Normal thyroid  CHEST/LUNG: Clear to auscultation. Clear to percussion bilaterally; No rales, rhonchi, wheezing, or rubs  HEART: Regular rate and rhythm; No murmurs, rubs, or gallops  ABDOMEN: Soft, Nontender, Nondistended; Bowel sounds present  MUSCULOSKELETAL RIGHT ARM TENDERNESS IS PRESENT, LIMITED ROM, SLING IN PLACE. RIGHT GROIN PAIN  NERVOUS SYSTEM:  Alert & Oriented X3,    EXTREMITIES:  2+ Peripheral Pulses, No clubbing, cyanosis, or edema  SKIN: warm dry

## 2020-03-11 NOTE — H&P ADULT - PROBLEM SELECTOR PLAN 4
Pt c/o Rt groin pain  f/u Xray of Rt femur  PT consult  Fall precautions  Pain management with Tylenol, Lidocaine patch, Percocet and Morphine  f/u SW for recurrent falls

## 2020-03-11 NOTE — H&P ADULT - ATTENDING COMMENTS
Patient seen and examined ; case was discussed with the admitting resident    ROS: as in the HPI; all other ROS negative    SH and family history as above    Vital Signs Last 24 Hrs  T(C): 37 (11 Mar 2020 02:51), Max: 37 (11 Mar 2020 02:51)  T(F): 98.6 (11 Mar 2020 02:51), Max: 98.6 (11 Mar 2020 02:51)  HR: 77 (11 Mar 2020 02:51) (77 - 77)  BP: 153/81 (11 Mar 2020 02:51) (153/81 - 153/81)  BP(mean): --  RR: 19 (11 Mar 2020 02:51) (19 - 19)  SpO2: 96% (11 Mar 2020 02:51) (96% - 96%)    GEN: NAD  HEENT- normocephalic; mouth moist  CVS- S1S2+  LUNGS- clear to auscultation; no wheezing  ABD: Soft , nontender, nondistended, Bowel sounds are present  EXTREMITY: no calf tenderness, no cyanosis, no edema  NEURO: AAOx3; non focal neurologic exam; cranial nerves grossly intact  PSYCH: normal affect and behavior  BACK: no swelling or mass;   VASCULAR: ++ distal peripheral pulses  SKIN: warm and dry.       Labs Reviewed:                         14.6   12.04 )-----------( 235      ( 11 Mar 2020 04:06 )             44.2     03-11    140  |  109<H>  |  28<H>  ----------------------------<  146<H>  4.0   |  25  |  0.95    Ca    9.4      11 Mar 2020 04:06            PT/INR - ( 11 Mar 2020 04:06 )   PT: 11.3 sec;   INR: 1.02 ratio         PTT - ( 11 Mar 2020 04:06 )  PTT:27.7 sec  BNP:   MEDICATIONS  (STANDING):  heparin  Injectable 5000 Unit(s) SubCutaneous every 8 hours  insulin lispro (HumaLOG) corrective regimen sliding scale   SubCutaneous Before meals and at bedtime  lactated ringers. 1000 milliLiter(s) (75 mL/Hr) IV Continuous <Continuous>  letrozole 2.5 milliGRAM(s) Oral daily  lidocaine   Patch 1 Patch Transdermal daily  meclizine 25 milliGRAM(s) Oral daily  mesalamine DR Capsule 1600 milliGRAM(s) Oral two times a day  montelukast 10 milliGRAM(s) Oral daily  predniSONE   Tablet 5 milliGRAM(s) Oral daily    MEDICATIONS  (PRN):  acetaminophen   Tablet .. 650 milliGRAM(s) Oral every 6 hours PRN Mild Pain (1 - 3)  HYDROmorphone  Injectable 1 milliGRAM(s) IV Push every 4 hours PRN Severe Pain (7 - 10)  oxyCODONE    IR 10 milliGRAM(s) Oral every 4 hours PRN Moderate Pain (4 - 6)      Xray R shoulder one view reviewed   EKG Reviewed    88 y/o F with UC, asthma, remote h/o breast Ca admitted with r prox humeral fx after mechanical fall.     1. R prox humeral fx- neurovasc intact, counseled patient to keep in sling and immobilize as she keeps moving arm, pain control, orthopedics evaluation. No imminent plans for surgical intervention per ED attending who spoke with ortho. PT eval after ortho recommendations   2. Acute pain- pain regimen, add bowel regimen, appreciate pain consultation   3. Ulcerative colitis  4. Chronic steroid use  5. Osteoporosis- clinical, counseled patient to get tx/ f/u once acute issues stabilize   6. Leukocytosis- suspect reactive to above issues, denies localizing s/s of infection   Plan of care discussed with patient ;  all questions and concerns were addressed.

## 2020-03-11 NOTE — H&P ADULT - ASSESSMENT
88 y/o F from home, lives alone, ambulates with cane  with PMHx of Ulcerative colitis, Asthma, GERD, Asthma and PSH of Bilateral mastectomy presents to the ED s/p fall.   ED vitals stable  EKG NSR  In ED, s/p sling placed on the right arm. s/p Lidocaine patch, Morphine, Oxycodone.

## 2020-03-11 NOTE — H&P ADULT - PROBLEM SELECTOR PLAN 2
Most likely due to Clinical osteoporosis due to Vitamin deficiency due to Ulcerative colitis vs Medication induced like Prednisone and Dexilant vs Poor oral intake vs Elderly age.  f/u Vit D level  Outpatient follow up for Osteoporosis

## 2020-03-11 NOTE — PATIENT PROFILE ADULT - FALL HARM RISK
recent fall/bones(Osteoporosis,prev fx,steroid use,metastatic bone ca)/age(85 years old or older)/other

## 2020-03-11 NOTE — ED PROVIDER NOTE - PHYSICAL EXAMINATION
Mild distress, right shoulder area tenderness.  distal radial and ulnar pulses intact, cap refill < 2 seconds, full range of motion of arm +elbow flexion/extension/supination and pronation intact, +flexion and extension of all digits at DIP and PIP.

## 2020-03-11 NOTE — PROGRESS NOTE ADULT - PROBLEM SELECTOR PLAN 2
likely due to Vitamin deficiency due to Ulcerative colitis vs Medication induced like Prednisone and Dexilant vs Poor oral intake vs Elderly age.  f/u Vit D level - result pending  Outpatient follow up for Osteoporosis

## 2020-03-11 NOTE — PATIENT PROFILE ADULT - COMPLETE THE FOLLOWING IF THE PATIENT REFUSES THE INFLUENZA VACCINE:
After Visit Summary   11/16/2018    Joce Lozano    MRN: [id number]           Patient Information     Date Of Birth          1935        Visit Information        Provider Department      11/16/2018 3:10 PM Scooter Macias PA-C [facility]        Today's Diagnoses     Hyperlipidemia LDL goal <100    -  1    Coronary artery disease involving native heart without angina pectoris, unspecified vessel or lesion type        Essential hypertension with goal blood pressure less than 140/90        Paroxysmal atrial fibrillation (H)        Bilateral leg edema          Care Instructions    Regarding the recent stents, continue cardiac rehab, as well as the aspirin and Plavix.   If you have chest pain, please call.     Regarding the swelling in the legs.  Continue Bumex and dialysis per your kidney doctor.  Also elevate the legs when you can.  We will also get an ultrasound of the veins in your legs. I will see you back after to review the results.           Follow-ups after your visit        Additional Services     Follow-Up with Cardiac Advanced Practice Provider                 Your next 10 appointments already scheduled     Nov 21, 2018 10:00 AM CST   Cardiac Treatment with Rh Cardiac Rehab 1   [facility] ([facility])    [address] [place] [address], [facility] [address]  [facility] [address]   [phone]            Nov 23, 2018  8:00 AM CST   Cardiac Treatment with Rh Cardiac Rehab 1   [facility] ([facility])    [id number] [place] [address], [facility] [address]  [facility] [address]   [phone]            Nov 28, 2018  8:00 AM CST   Cardiac Treatment with Rh Cardiac Rehab 1   [facility] ([facility])    [id number] [place] [address], [facility] [address]  [facility] [address]   [phone]            Nov 30, 2018  8:00 AM CST   Cardiac Treatment with Rh Cardiac Rehab 1   [facility]  ([facility])    [address]  [facility] [address]   [phone]            Dec 05, 2018  8:00 AM CST   Cardiac Treatment with Rh Cardiac Rehab 1   [facility] ([facility])    [address]  [facility] [address]   [phone]            Dec 07, 2018  8:00 AM CST   Cardiac Treatment with Rh Cardiac Rehab 1   [facility] ([facility])    [address]  [facility] [address]   [phone]            Dec 07, 2018  9:00 AM CST   CONSULT with Rh Cardiac Rehab 1   [facility] ([facility])    [address]  [facility] [address]   [phone]            Dec 07, 2018  9:30 AM CST   US LOWER EXTREMITY VENOUS COMPETENCY BILATERAL with RHECHVUS   [facility] ([facility])    [address]  [facility] [address]   [phone]           How do I prepare for my exam? (Food and drink instructions) No Food and Drink Restrictions.  How do I prepare for my exam? (Other instructions) You do not need to do anything special to prepare for your exam.  What should I wear: Wear comfortable clothes.  How long does the exam take: Most ultrasounds take 30 to 60 minutes.  What should I bring: Bring a list of your medicines, including vitamins, minerals and over-the-counter drugs. It is safest to leave personal items at home.  Do I need a :  No  is needed.  What do I need to tell my doctor: Tell your doctor about any allergies you may have.  What should I do after the exam: No restrictions, You may resume normal activities.  What is this test: An ultrasound uses sound waves to make pictures of the body. Sound waves do not cause pain. The only discomfort may be the pressure of the wand against your skin or full bladder.  Who should I call with questions: If you have any  questions, please call the Imaging Department where you will have your exam. Directions, parking instructions, and other information is available on our website, [email or web address].            Dec 12, 2018 10:00 AM CST   Cardiac Treatment with Rh Cardiac Rehab 1   [facility] ([facility])    [address]  [facility] [id number] [phone]            Dec 14, 2018 10:00 AM CST   Cardiac Treatment with Rh Cardiac Rehab 1   [facility] ([facility])    [address]  [facility] [id number] [phone]              Future tests that were ordered for you today     Open Future Orders        Priority Expected Expires Ordered    Follow-Up with Cardiac Advanced Practice Provider Routine 12/7/2018 11/16/2019 11/16/2018     Venous Competency Bilateral Routine 11/30/2018 11/16/2019 11/16/2018            Who to contact     If you have questions or need follow up information about today's clinic visit or your schedule please contact [facility] directly at [phone].  Normal or non-critical lab and imaging results will be communicated to you by MyChart, letter or phone within 4 business days after the clinic has received the results. If you do not hear from us within 7 days, please contact the clinic through [employer] or phone. If you have a critical or abnormal lab result, we will notify you by phone as soon as possible.  Submit refill requests through Fio or call your pharmacy and they will forward the refill request to us. Please allow 3 business days for your refill to be completed.          Additional Information About Your Visit        MyChart Information     Fio gives you secure access to your electronic health record. If you see a primary care provider, you can also send messages to your care team and make appointments. If you have questions, please call your  "primary care clinic.  If you do not have a primary care provider, please call [phone] and they will assist you.        Care EveryWhere ID     This is your Care EveryWhere ID. This could be used by other organizations to access your [facility] medical records  [id number]        Your Vitals Were     Pulse Height BMI (Body Mass Index)             68 1.753 m (5' 9\") 22.02 kg/m2          Blood Pressure from Last 3 Encounters:   11/16/18 124/46   11/07/18 118/59   11/02/18 125/61    Weight from Last 3 Encounters:   11/16/18 67.6 kg (149 lb 1.6 oz)   11/07/18 68 kg (150 lb)   11/01/18 66.8 kg (147 lb 4.3 oz)              We Performed the Following     Follow-Up with Cardiac Advanced Practice Provider        Primary Care Provider Office Phone # Fax #    Evangelina Julito Watts [phone] [address] [facility] [address]  [facility] [address]        Equal Access to Services     INDIGO [facility] Hospital[facility]L : Hadii aad ku hadasho Soomaali, waaxda luqadaha, qaybta kaalmada adeegyada, awilda ramos hayvan jeronimo . So [facility] [phone].    ATENCIÓN: Si habla español, tiene a boyle disposición servicios gratuitos de asistencia lingüística. [facility] [phone].    We comply with applicable federal civil rights laws and [place] laws. We do not discriminate on the basis of race, color, national origin, age, disability, sex, sexual orientation, or gender identity.            Thank you!     Thank you for choosing [facility]  for your care. Our goal is always to provide you with excellent care. Hearing back from our patients is one way we can continue to improve our services. Please take a few minutes to complete the written survey that you may receive in the mail after your visit with us. Thank you!             Your Updated Medication List - Protect others around you: Learn how to safely use, store and throw away your medicines at www.disposemymeds.org.          This list is accurate " as of 11/16/18  4:09 PM.  Always use your most recent med list.                   Brand Name Dispense Instructions for use Diagnosis    allopurinol 100 MG tablet    ZYLOPRIM    90 tablet    Take 1 tablet (100 mg) by mouth daily    Elevated blood uric acid level       amLODIPine 5 MG tablet    NORVASC    180 tablet    Take 1 tablet (5 mg) by mouth 2 times daily    Essential hypertension       amoxicillin 250 MG capsule    AMOXIL    20 capsule    Take 1 capsule (250 mg) by mouth 2 times daily for 10 days    Acute recurrent sinusitis, unspecified location       aspirin 81 MG EC tablet     90 tablet    Take 1 tablet (81 mg) by mouth daily    ASHD (arteriosclerotic heart disease)       atorvastatin 20 MG tablet    LIPITOR    90 tablet    Take 1 tablet (20 mg) by mouth At Bedtime    Hyperlipidemia LDL goal <100       * bumetanide 1 MG tablet    BUMEX     Take 2 mg by mouth every morning        * bumetanide 1 MG tablet    BUMEX     Take 1 mg by mouth every evening        * calcium acetate 667 MG Caps capsule    PHOSLO     Take 667 mg by mouth daily With a snack.        * calcium acetate 667 MG Caps capsule    PHOSLO    180 each    Take 2 capsules (1,334 mg) by mouth 3 times daily (with meals) And 1 capsule (667 mg) daily with a snack    ESRD (end stage renal disease) on dialysis (H)       camphor-eucalyptus-menthol 4.73-1.2-2.6 % Oint ointment      Apply topically daily as needed (to toes PRN nail fungus per patient)        carvedilol 6.25 MG tablet    COREG    180 tablet    Take 1 tablet (6.25 mg) by mouth 2 times daily (with meals)    Essential hypertension with goal blood pressure less than 140/90       clopidogrel 75 MG tablet    PLAVIX    90 tablet    Take 1 tablet (75 mg) by mouth daily    Unstable angina (H)       * gabapentin 100 MG capsule    NEURONTIN     Take 200 mg by mouth every morning        * gabapentin 100 MG capsule    NEURONTIN     Take 100 mg by mouth every evening        isosorbide mononitrate 30 MG 24  hr tablet    IMDUR     Take 30 mg by mouth 2 times daily        losartan 100 MG tablet    COZAAR    90 tablet    Take 1 tablet (100 mg) by mouth At Bedtime    Essential hypertension with goal blood pressure less than 140/90       nitroGLYcerin 0.4 MG sublingual tablet    NITROSTAT    25 tablet    Place 1 tablet (0.4 mg) under the tongue every 5 minutes as needed for chest pain    Unstable angina (H), ASHD (arteriosclerotic heart disease)       omeprazole 40 MG capsule    priLOSEC    60 capsule    Take 1 capsule (40 mg) by mouth 2 times daily    Gastroesophageal reflux disease, esophagitis presence not specified       * predniSONE 5 MG tablet    DELTASONE    90 tablet    Take 1 tablet (5 mg) by mouth daily    Rheumatoid arthritis, involving unspecified site, unspecified rheumatoid factor presence (H)       * predniSONE 20 MG tablet    DELTASONE    20 tablet    Take 3 tabs (60 mg) by mouth daily x 3 days, 2 tabs (40 mg) daily x 3 days, 1 tab (20 mg) daily x 3 days, then 1/2 tab (10 mg) x 3 days.    Community acquired pneumonia, unspecified laterality       JAC CAPS 1 MG Caps     90 capsule    TAKE 1 BY MOUTH DAILY    ESRD (end stage renal disease) on dialysis (H)       senna-docusate 8.6-50 MG per tablet    DOK PLUS    180 tablet    TAKE 1 BY MOUTH TWO TIMES  DAILY    Constipation, unspecified constipation type       TYLENOL PO      Take 1,000 mg by mouth 3 times daily        vitamin D3 2000 units Caps     90 capsule    TAKE 1 BY MOUTH DAILY    ESRD (end stage renal disease) on dialysis (H)       * Notice:  This list has 8 medication(s) that are the same as other medications prescribed for you. Read the directions carefully, and ask your doctor or other care provider to review them with you.       Risks/benefits discussed with patient/surrogate

## 2020-03-11 NOTE — ED ADULT NURSE NOTE - NSIMPLEMENTINTERV_GEN_ALL_ED
Implemented All Fall Risk Interventions:  [place] to call system. Call bell, personal items and telephone within reach. Instruct patient to call for assistance. Room bathroom lighting operational. Non-slip footwear when patient is off stretcher. Physically safe environment: no spills, clutter or unnecessary equipment. Stretcher in lowest position, wheels locked, appropriate side rails in place. Provide visual cue, wrist band, yellow gown, etc. Monitor gait and stability. Monitor for mental status changes and reorient to person, place, and time. Review medications for side effects contributing to fall risk. Reinforce activity limits and safety measures with patient and family.

## 2020-03-11 NOTE — ED PROVIDER NOTE - OBJECTIVE STATEMENT
88 y/o female with PMHx of Crohn's disease and asthma presents to the ED s/p fall. Pt states that she wears a heavy surgical boot on her right foot and tripped at home at 8:30PM. Pt landed onto her right shoulder. Pt denies any head trauma, LOC, or other complaints. Pt is also under the care of Dr. Henderson.

## 2020-03-12 LAB
GLUCOSE BLDC GLUCOMTR-MCNC: 101 MG/DL — HIGH (ref 70–99)
GLUCOSE BLDC GLUCOMTR-MCNC: 104 MG/DL — HIGH (ref 70–99)
GLUCOSE BLDC GLUCOMTR-MCNC: 117 MG/DL — HIGH (ref 70–99)
GLUCOSE BLDC GLUCOMTR-MCNC: 120 MG/DL — HIGH (ref 70–99)
GLUCOSE BLDC GLUCOMTR-MCNC: 123 MG/DL — HIGH (ref 70–99)
GLUCOSE BLDC GLUCOMTR-MCNC: 125 MG/DL — HIGH (ref 70–99)

## 2020-03-12 PROCEDURE — 99232 SBSQ HOSP IP/OBS MODERATE 35: CPT | Mod: GC

## 2020-03-12 PROCEDURE — 72192 CT PELVIS W/O DYE: CPT | Mod: 26

## 2020-03-12 PROCEDURE — 73700 CT LOWER EXTREMITY W/O DYE: CPT | Mod: 26,RT

## 2020-03-12 RX ORDER — CHOLECALCIFEROL (VITAMIN D3) 125 MCG
2000 CAPSULE ORAL DAILY
Refills: 0 | Status: DISCONTINUED | OUTPATIENT
Start: 2020-03-12 | End: 2020-03-14

## 2020-03-12 RX ORDER — KETOROLAC TROMETHAMINE 30 MG/ML
15 SYRINGE (ML) INJECTION EVERY 6 HOURS
Refills: 0 | Status: DISCONTINUED | OUTPATIENT
Start: 2020-03-12 | End: 2020-03-14

## 2020-03-12 RX ORDER — OXYCODONE HYDROCHLORIDE 5 MG/1
5 TABLET ORAL EVERY 6 HOURS
Refills: 0 | Status: DISCONTINUED | OUTPATIENT
Start: 2020-03-12 | End: 2020-03-14

## 2020-03-12 RX ADMIN — HYDROMORPHONE HYDROCHLORIDE 1 MILLIGRAM(S): 2 INJECTION INTRAMUSCULAR; INTRAVENOUS; SUBCUTANEOUS at 09:55

## 2020-03-12 RX ADMIN — HYDROMORPHONE HYDROCHLORIDE 1 MILLIGRAM(S): 2 INJECTION INTRAMUSCULAR; INTRAVENOUS; SUBCUTANEOUS at 14:50

## 2020-03-12 RX ADMIN — HYDROMORPHONE HYDROCHLORIDE 1 MILLIGRAM(S): 2 INJECTION INTRAMUSCULAR; INTRAVENOUS; SUBCUTANEOUS at 23:35

## 2020-03-12 RX ADMIN — HYDROMORPHONE HYDROCHLORIDE 1 MILLIGRAM(S): 2 INJECTION INTRAMUSCULAR; INTRAVENOUS; SUBCUTANEOUS at 23:53

## 2020-03-12 RX ADMIN — HEPARIN SODIUM 5000 UNIT(S): 5000 INJECTION INTRAVENOUS; SUBCUTANEOUS at 21:55

## 2020-03-12 RX ADMIN — LETROZOLE 2.5 MILLIGRAM(S): 2.5 TABLET, FILM COATED ORAL at 12:36

## 2020-03-12 RX ADMIN — LIDOCAINE 1 PATCH: 4 CREAM TOPICAL at 12:35

## 2020-03-12 RX ADMIN — Medication 15 MILLIGRAM(S): at 16:33

## 2020-03-12 RX ADMIN — Medication 1600 MILLIGRAM(S): at 17:34

## 2020-03-12 RX ADMIN — Medication 3 MILLILITER(S): at 02:36

## 2020-03-12 RX ADMIN — HEPARIN SODIUM 5000 UNIT(S): 5000 INJECTION INTRAVENOUS; SUBCUTANEOUS at 13:25

## 2020-03-12 RX ADMIN — HEPARIN SODIUM 5000 UNIT(S): 5000 INJECTION INTRAVENOUS; SUBCUTANEOUS at 05:54

## 2020-03-12 RX ADMIN — Medication 5 MILLIGRAM(S): at 08:29

## 2020-03-12 RX ADMIN — HYDROMORPHONE HYDROCHLORIDE 1 MILLIGRAM(S): 2 INJECTION INTRAMUSCULAR; INTRAVENOUS; SUBCUTANEOUS at 10:15

## 2020-03-12 RX ADMIN — LIDOCAINE 1 PATCH: 4 CREAM TOPICAL at 19:17

## 2020-03-12 RX ADMIN — MONTELUKAST 10 MILLIGRAM(S): 4 TABLET, CHEWABLE ORAL at 12:35

## 2020-03-12 RX ADMIN — HYDROMORPHONE HYDROCHLORIDE 1 MILLIGRAM(S): 2 INJECTION INTRAMUSCULAR; INTRAVENOUS; SUBCUTANEOUS at 05:50

## 2020-03-12 RX ADMIN — HYDROMORPHONE HYDROCHLORIDE 1 MILLIGRAM(S): 2 INJECTION INTRAMUSCULAR; INTRAVENOUS; SUBCUTANEOUS at 15:00

## 2020-03-12 RX ADMIN — Medication 25 MILLIGRAM(S): at 12:35

## 2020-03-12 NOTE — CONSULT NOTE ADULT - ASSESSMENT
Bronchial Asthma   Fx Rt Humerus  Ulcerative Colitis  GERD  B/L Mastectomies      PLAN- Singulair 10 mg hs   Pulmicort 0.5 mg/2 ml by Nebuliser BID  Ventolin inhaler prn   s/C lovenox   Ortho f/u   P/T   Analgesics   Discussed with Dr Newell

## 2020-03-12 NOTE — PHYSICAL THERAPY INITIAL EVALUATION ADULT - GENERAL OBSERVATIONS, REHAB EVAL
Patient received in supine; AxOx3; Patient w/ IV line, wilkinson catheter, sling on (R) shoulder. Patient c/o pain on (R)

## 2020-03-12 NOTE — PHYSICAL THERAPY INITIAL EVALUATION ADULT - CRITERIA FOR SKILLED THERAPEUTIC INTERVENTIONS
impairments found/therapy frequency/functional limitations in following categories/risk reduction/prevention/rehab potential

## 2020-03-12 NOTE — PHYSICAL THERAPY INITIAL EVALUATION ADULT - ADDITIONAL COMMENTS
At this time, no surgical intervention is recommended for (R) shoulder fracture; patient will be NWB and be using a sling.

## 2020-03-12 NOTE — PHYSICAL THERAPY INITIAL EVALUATION ADULT - PERTINENT HX OF CURRENT PROBLEM, REHAB EVAL
Patient w/ h/o Ulcerative colitis, Asthma, GERD, B/L Mastectomy, admitted from home due to a fall at home. Patient states she fell in the kitchen while getting water, injuring her (R) shoulder.

## 2020-03-12 NOTE — PROGRESS NOTE ADULT - SUBJECTIVE AND OBJECTIVE BOX
PGY 1 Note discussed with supervising resident and primary attending    Patient is a 85y old  Female who presents with a chief complaint of s/p fall (11 Mar 2020 13:41)      INTERVAL HPI/OVERNIGHT EVENTS:   Patient is complaining of severe pain r shoulder and R groin area. SHe was given dilaudid by the nurse which helped the pain.  She was seen by orthopedist yesterday who recommended WBAT for RLE, NWB for RUE  She states that she tried moving her leg but couldnt do it because of pain    MEDICATIONS  (STANDING):  ALBUTerol    90 MICROgram(s) HFA Inhaler 1 Puff(s) Inhalation every 4 hours  albuterol/ipratropium for Nebulization 3 milliLiter(s) Nebulizer every 6 hours  cholecalciferol 2000 Unit(s) Oral daily  heparin  Injectable 5000 Unit(s) SubCutaneous every 8 hours  insulin lispro (HumaLOG) corrective regimen sliding scale   SubCutaneous Before meals and at bedtime  lactated ringers. 1000 milliLiter(s) (75 mL/Hr) IV Continuous <Continuous>  letrozole 2.5 milliGRAM(s) Oral daily  lidocaine   Patch 1 Patch Transdermal daily  meclizine 25 milliGRAM(s) Oral daily  mesalamine DR Capsule 1600 milliGRAM(s) Oral two times a day  montelukast 10 milliGRAM(s) Oral daily  predniSONE   Tablet 5 milliGRAM(s) Oral daily  tiotropium 18 MICROgram(s) Capsule 1 Capsule(s) Inhalation daily    MEDICATIONS  (PRN):  acetaminophen   Tablet .. 650 milliGRAM(s) Oral every 6 hours PRN Mild Pain (1 - 3)  HYDROmorphone  Injectable 1 milliGRAM(s) IV Push every 4 hours PRN Severe Pain (7 - 10)  oxyCODONE    IR 10 milliGRAM(s) Oral every 4 hours PRN Moderate Pain (4 - 6)      __________________________________________________  REVIEW OF SYSTEMS:    CONSTITUTIONAL: No fever,   RESPIRATORY: No cough; No shortness of breath  CARDIOVASCULAR: No chest pain, no palpitations  GASTROINTESTINAL: No pain. No nausea or vomiting; No diarrhea   NEUROLOGICAL: No headache or numbness, no tremors  MUSCULOSKELETAL: severe pain 10/10 in R shoulder and R groin area, difficulty moving them  GENITOURINARY: no dysuria, no frequency, no hesitancy  PSYCHIATRY: no depression , no anxiety  ALL OTHER  ROS negative        Vital Signs Last 24 Hrs  T(C): 37.4 (12 Mar 2020 05:35), Max: 37.4 (12 Mar 2020 05:35)  T(F): 99.4 (12 Mar 2020 05:35), Max: 99.4 (12 Mar 2020 05:35)  HR: 97 (12 Mar 2020 05:35) (86 - 97)  BP: 112/59 (12 Mar 2020 05:35) (105/63 - 123/76)  BP(mean): --  RR: 18 (12 Mar 2020 05:35) (16 - 18)  SpO2: 94% (12 Mar 2020 05:35) (91% - 94%)    ________________________________________________  PHYSICAL EXAM:  GENERAL: distress due to pain  CHEST/LUNG: Clear to auscultation bilaterally with good air entry   HEART: S1 S2  regular; no murmurs, gallops or rubs  ABDOMEN: Soft, Nontender, Nondistended; Bowel sounds present  EXTREMITIES: tenderness on R shoulder and arm. tenderness in r groin. did not test the movements as she was in severe pain  SKIN: warm and dry; no rash  NERVOUS SYSTEM:  Awake and alert; Oriented  to place, person and time ; no new deficits    _________________________________________________  LABS:                        13.3   9.92  )-----------( 209      ( 11 Mar 2020 10:53 )             39.6     03-11    139  |  106  |  25<H>  ----------------------------<  129<H>  3.5   |  27  |  0.79    Ca    8.8      11 Mar 2020 10:53  Phos  3.3     03-11  Mg     2.0     03-11    TPro  7.0  /  Alb  3.6  /  TBili  0.5  /  DBili  x   /  AST  35  /  ALT  52  /  AlkPhos  67  03-11    PT/INR - ( 11 Mar 2020 10:53 )   PT: 12.4 sec;   INR: 1.09 ratio         PTT - ( 11 Mar 2020 04:06 )  PTT:27.7 sec    CAPILLARY BLOOD GLUCOSE      POCT Blood Glucose.: 117 mg/dL (12 Mar 2020 08:14)  POCT Blood Glucose.: 110 mg/dL (11 Mar 2020 15:47)  POCT Blood Glucose.: 104 mg/dL (11 Mar 2020 11:22)        RADIOLOGY & ADDITIONAL TESTS:    Imaging Personally Reviewed:  YES/NO    Consultant(s) Notes Reviewed:   YES/ No    Care Discussed with Consultants :     Plan of care was discussed with patient and /or primary care giver; all questions and concerns were addressed and care was aligned with patient's wishes.

## 2020-03-12 NOTE — PROGRESS NOTE ADULT - PROBLEM SELECTOR PLAN 2
Pt p/a she had multiple falls. She was found to have humeral #. She also has pain in R groin but has no #. As per ortho rec she is NWB of RUE, WBAT of RLE.   Fall precautions  Pain management with Tylenol, Lidocaine patch, Percocet and Morphine  As per the history and assessment, she might be having difficulty walking with boots  f/u SW for recurrent falls  PT evaluation is pending Pt p/a she had multiple falls. She was found to have humeral #. She also has pain in R groin but has no # on XRay. will do ct of r leg to r/o definitely   As per ortho rec she is NWB of RUE, WBAT of RLE.   Fall precautions  Pain management with oxycodone, toradol, hydromorphone  As per the history and assessment, she might be having difficulty walking with boots  PT evaluation recommended fro rehab   will give choices for rehab

## 2020-03-12 NOTE — PHYSICAL THERAPY INITIAL EVALUATION ADULT - RANGE OF MOTION EXAMINATION, REHAB EVAL
deficits as listed below/R shoulder not assessed due to injury; limited left elbow and hand movements due to inc. pain
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
## 2020-03-13 ENCOUNTER — TRANSCRIPTION ENCOUNTER (OUTPATIENT)
Age: 85
End: 2020-03-13

## 2020-03-13 LAB
ANION GAP SERPL CALC-SCNC: 9 MMOL/L — SIGNIFICANT CHANGE UP (ref 5–17)
BUN SERPL-MCNC: 17 MG/DL — SIGNIFICANT CHANGE UP (ref 7–18)
CALCIUM SERPL-MCNC: 8.6 MG/DL — SIGNIFICANT CHANGE UP (ref 8.4–10.5)
CHLORIDE SERPL-SCNC: 105 MMOL/L — SIGNIFICANT CHANGE UP (ref 96–108)
CO2 SERPL-SCNC: 25 MMOL/L — SIGNIFICANT CHANGE UP (ref 22–31)
CREAT SERPL-MCNC: 0.72 MG/DL — SIGNIFICANT CHANGE UP (ref 0.5–1.3)
GLUCOSE BLDC GLUCOMTR-MCNC: 103 MG/DL — HIGH (ref 70–99)
GLUCOSE BLDC GLUCOMTR-MCNC: 108 MG/DL — HIGH (ref 70–99)
GLUCOSE BLDC GLUCOMTR-MCNC: 116 MG/DL — HIGH (ref 70–99)
GLUCOSE BLDC GLUCOMTR-MCNC: 97 MG/DL — SIGNIFICANT CHANGE UP (ref 70–99)
GLUCOSE SERPL-MCNC: 92 MG/DL — SIGNIFICANT CHANGE UP (ref 70–99)
HCT VFR BLD CALC: 38.4 % — SIGNIFICANT CHANGE UP (ref 34.5–45)
HGB BLD-MCNC: 12.6 G/DL — SIGNIFICANT CHANGE UP (ref 11.5–15.5)
MCHC RBC-ENTMCNC: 30.9 PG — SIGNIFICANT CHANGE UP (ref 27–34)
MCHC RBC-ENTMCNC: 32.8 GM/DL — SIGNIFICANT CHANGE UP (ref 32–36)
MCV RBC AUTO: 94.1 FL — SIGNIFICANT CHANGE UP (ref 80–100)
NRBC # BLD: 0 /100 WBCS — SIGNIFICANT CHANGE UP (ref 0–0)
PLATELET # BLD AUTO: 205 K/UL — SIGNIFICANT CHANGE UP (ref 150–400)
POTASSIUM SERPL-MCNC: 3.8 MMOL/L — SIGNIFICANT CHANGE UP (ref 3.5–5.3)
POTASSIUM SERPL-SCNC: 3.8 MMOL/L — SIGNIFICANT CHANGE UP (ref 3.5–5.3)
RBC # BLD: 4.08 M/UL — SIGNIFICANT CHANGE UP (ref 3.8–5.2)
RBC # FLD: 13.9 % — SIGNIFICANT CHANGE UP (ref 10.3–14.5)
SODIUM SERPL-SCNC: 139 MMOL/L — SIGNIFICANT CHANGE UP (ref 135–145)
WBC # BLD: 8.36 K/UL — SIGNIFICANT CHANGE UP (ref 3.8–10.5)
WBC # FLD AUTO: 8.36 K/UL — SIGNIFICANT CHANGE UP (ref 3.8–10.5)

## 2020-03-13 PROCEDURE — 99232 SBSQ HOSP IP/OBS MODERATE 35: CPT | Mod: GC

## 2020-03-13 RX ORDER — BENZOCAINE AND MENTHOL 5; 1 G/100ML; G/100ML
1 LIQUID ORAL EVERY 4 HOURS
Refills: 0 | Status: DISCONTINUED | OUTPATIENT
Start: 2020-03-13 | End: 2020-03-14

## 2020-03-13 RX ORDER — ALBUTEROL 90 UG/1
2 AEROSOL, METERED ORAL EVERY 6 HOURS
Refills: 0 | Status: DISCONTINUED | OUTPATIENT
Start: 2020-03-13 | End: 2020-03-14

## 2020-03-13 RX ORDER — TIOTROPIUM BROMIDE 18 UG/1
1 CAPSULE ORAL; RESPIRATORY (INHALATION) DAILY
Refills: 0 | Status: DISCONTINUED | OUTPATIENT
Start: 2020-03-13 | End: 2020-03-14

## 2020-03-13 RX ADMIN — Medication 15 MILLIGRAM(S): at 19:00

## 2020-03-13 RX ADMIN — LIDOCAINE 1 PATCH: 4 CREAM TOPICAL at 11:07

## 2020-03-13 RX ADMIN — HYDROMORPHONE HYDROCHLORIDE 1 MILLIGRAM(S): 2 INJECTION INTRAMUSCULAR; INTRAVENOUS; SUBCUTANEOUS at 21:46

## 2020-03-13 RX ADMIN — MONTELUKAST 10 MILLIGRAM(S): 4 TABLET, CHEWABLE ORAL at 11:14

## 2020-03-13 RX ADMIN — HYDROMORPHONE HYDROCHLORIDE 1 MILLIGRAM(S): 2 INJECTION INTRAMUSCULAR; INTRAVENOUS; SUBCUTANEOUS at 05:56

## 2020-03-13 RX ADMIN — HYDROMORPHONE HYDROCHLORIDE 1 MILLIGRAM(S): 2 INJECTION INTRAMUSCULAR; INTRAVENOUS; SUBCUTANEOUS at 21:28

## 2020-03-13 RX ADMIN — LETROZOLE 2.5 MILLIGRAM(S): 2.5 TABLET, FILM COATED ORAL at 11:16

## 2020-03-13 RX ADMIN — HEPARIN SODIUM 5000 UNIT(S): 5000 INJECTION INTRAVENOUS; SUBCUTANEOUS at 21:27

## 2020-03-13 RX ADMIN — Medication 15 MILLIGRAM(S): at 11:40

## 2020-03-13 RX ADMIN — HYDROMORPHONE HYDROCHLORIDE 1 MILLIGRAM(S): 2 INJECTION INTRAMUSCULAR; INTRAVENOUS; SUBCUTANEOUS at 10:15

## 2020-03-13 RX ADMIN — Medication 2000 UNIT(S): at 11:15

## 2020-03-13 RX ADMIN — HYDROMORPHONE HYDROCHLORIDE 1 MILLIGRAM(S): 2 INJECTION INTRAMUSCULAR; INTRAVENOUS; SUBCUTANEOUS at 16:21

## 2020-03-13 RX ADMIN — HYDROMORPHONE HYDROCHLORIDE 1 MILLIGRAM(S): 2 INJECTION INTRAMUSCULAR; INTRAVENOUS; SUBCUTANEOUS at 06:15

## 2020-03-13 RX ADMIN — ALBUTEROL 2 PUFF(S): 90 AEROSOL, METERED ORAL at 11:13

## 2020-03-13 RX ADMIN — Medication 25 MILLIGRAM(S): at 11:15

## 2020-03-13 RX ADMIN — Medication 5 MILLIGRAM(S): at 05:55

## 2020-03-13 RX ADMIN — LIDOCAINE 1 PATCH: 4 CREAM TOPICAL at 18:50

## 2020-03-13 RX ADMIN — Medication 15 MILLIGRAM(S): at 11:13

## 2020-03-13 RX ADMIN — BENZOCAINE AND MENTHOL 1 LOZENGE: 5; 1 LIQUID ORAL at 21:27

## 2020-03-13 RX ADMIN — HYDROMORPHONE HYDROCHLORIDE 1 MILLIGRAM(S): 2 INJECTION INTRAMUSCULAR; INTRAVENOUS; SUBCUTANEOUS at 10:03

## 2020-03-13 RX ADMIN — Medication 1600 MILLIGRAM(S): at 17:06

## 2020-03-13 RX ADMIN — HYDROMORPHONE HYDROCHLORIDE 1 MILLIGRAM(S): 2 INJECTION INTRAMUSCULAR; INTRAVENOUS; SUBCUTANEOUS at 16:41

## 2020-03-13 RX ADMIN — HEPARIN SODIUM 5000 UNIT(S): 5000 INJECTION INTRAVENOUS; SUBCUTANEOUS at 05:59

## 2020-03-13 RX ADMIN — LIDOCAINE 1 PATCH: 4 CREAM TOPICAL at 11:14

## 2020-03-13 RX ADMIN — LIDOCAINE 1 PATCH: 4 CREAM TOPICAL at 23:32

## 2020-03-13 RX ADMIN — BENZOCAINE AND MENTHOL 1 LOZENGE: 5; 1 LIQUID ORAL at 17:06

## 2020-03-13 NOTE — PROGRESS NOTE ADULT - PROBLEM SELECTOR PLAN 3
Most likely due to Clinical osteoporosis due to Vitamin deficiency due to Ulcerative colitis vs Medication induced like Prednisone and Dexilant vs Poor oral intake vs Elderly age.  Vit d levels are low, started on vit 2000 u daily  Outpatient follow up for Osteoporosis
Most likely due to Clinical osteoporosis due to Vitamin deficiency due to Ulcerative colitis vs Medication induced like Prednisone and Dexilant vs Poor oral intake vs Elderly age.  Vit d levels are low, started on vit 2000 u daily  Outpatient follow up for Osteoporosis
on Mesalamine   takes prednisone 10mg every other day and started on prednisone 5mg daily.

## 2020-03-13 NOTE — PROGRESS NOTE ADULT - SUBJECTIVE AND OBJECTIVE BOX
PGY 1 Note discussed with supervising resident and primary attending    Patient is a 85y old  Female who presents with a chief complaint of s/p fall (13 Mar 2020 10:21)      INTERVAL HPI/OVERNIGHT EVENTS:   Pain is better controlled  CT scan was negative for LE doppler   She is planned for discharge tomorrow    MEDICATIONS  (STANDING):  cholecalciferol 2000 Unit(s) Oral daily  heparin  Injectable 5000 Unit(s) SubCutaneous every 8 hours  insulin lispro (HumaLOG) corrective regimen sliding scale   SubCutaneous Before meals and at bedtime  lactated ringers. 1000 milliLiter(s) (75 mL/Hr) IV Continuous <Continuous>  letrozole 2.5 milliGRAM(s) Oral daily  lidocaine   Patch 1 Patch Transdermal daily  meclizine 25 milliGRAM(s) Oral daily  mesalamine DR Capsule 1600 milliGRAM(s) Oral two times a day  montelukast 10 milliGRAM(s) Oral daily  predniSONE   Tablet 5 milliGRAM(s) Oral daily  tiotropium 18 MICROgram(s) Capsule 1 Capsule(s) Inhalation daily    MEDICATIONS  (PRN):  acetaminophen   Tablet .. 650 milliGRAM(s) Oral every 6 hours PRN Mild Pain (1 - 3)  ALBUTerol    90 MICROgram(s) HFA Inhaler 2 Puff(s) Inhalation every 6 hours PRN Shortness of Breath and/or Wheezing  HYDROmorphone  Injectable 1 milliGRAM(s) IV Push every 4 hours PRN Severe Pain (7 - 10)  ketorolac   Injectable 15 milliGRAM(s) IV Push every 6 hours PRN Moderate Pain (4 - 6)  oxyCODONE    IR 5 milliGRAM(s) Oral every 6 hours PRN Mild Pain (1 - 3)      __________________________________________________  REVIEW OF SYSTEMS:    CONSTITUTIONAL: No fever,   EYES: no acute visual disturbances  NECK: No pain or stiffness  RESPIRATORY: No cough; No shortness of breath  CARDIOVASCULAR: No chest pain, no palpitations  GASTROINTESTINAL: No pain. No nausea or vomiting; No diarrhea   NEUROLOGICAL: No headache or numbness, no tremors  MUSCULOSKELETAL: 6/10 pain of R shoulder,   GENITOURINARY: no dysuria, no frequency, no hesitancy  PSYCHIATRY: no depression , no anxiety  ALL OTHER  ROS negative        Vital Signs Last 24 Hrs  T(C): 37 (13 Mar 2020 05:58), Max: 37.4 (12 Mar 2020 14:48)  T(F): 98.6 (13 Mar 2020 05:58), Max: 99.4 (12 Mar 2020 14:48)  HR: 82 (13 Mar 2020 05:58) (81 - 93)  BP: 115/75 (13 Mar 2020 05:58) (111/70 - 115/75)  BP(mean): --  RR: 18 (13 Mar 2020 05:58) (18 - 18)  SpO2: 96% (13 Mar 2020 05:58) (95% - 96%)    ________________________________________________  PHYSICAL EXAM:  GENERAL: NAD  HEENT: Normocephalic;  conjunctivae and sclerae clear; moist mucous membranes;   NECK : supple  CHEST/LUNG: Clear to auscultation bilaterally with good air entry   HEART: S1 S2  regular; no murmurs, gallops or rubs  ABDOMEN: Soft, Nontender, Nondistended; Bowel sounds present  EXTREMITIES: tenderness of R shoulder and R  groin  SKIN: warm and dry; no rash  NERVOUS SYSTEM:  Awake and alert; Oriented  to place, person and time ; no new deficits    _________________________________________________  LABS:                        12.6   8.36  )-----------( 205      ( 13 Mar 2020 07:11 )             38.4     03-13    139  |  105  |  17  ----------------------------<  92  3.8   |  25  |  0.72    Ca    8.6      13 Mar 2020 07:11          CAPILLARY BLOOD GLUCOSE      POCT Blood Glucose.: 97 mg/dL (13 Mar 2020 08:12)  POCT Blood Glucose.: 101 mg/dL (12 Mar 2020 21:18)  POCT Blood Glucose.: 104 mg/dL (12 Mar 2020 16:30)  POCT Blood Glucose.: 123 mg/dL (12 Mar 2020 11:44)        RADIOLOGY & ADDITIONAL TESTS:    Imaging Personally Reviewed:  YES/NO    Consultant(s) Notes Reviewed:   YES/ No    Care Discussed with Consultants :     Plan of care was discussed with patient and /or primary care giver; all questions and concerns were addressed and care was aligned with patient's wishes. PGY 1 Note discussed with supervising resident and primary attending    Patient is a 85y old  Female who presents with a chief complaint of s/p fall (13 Mar 2020 10:21)      INTERVAL HPI/OVERNIGHT EVENTS:   Pain is better controlled  CT scan was negative acute pelvic  fracture  She is planned for discharge tomorrow    MEDICATIONS  (STANDING):  cholecalciferol 2000 Unit(s) Oral daily  heparin  Injectable 5000 Unit(s) SubCutaneous every 8 hours  insulin lispro (HumaLOG) corrective regimen sliding scale   SubCutaneous Before meals and at bedtime  lactated ringers. 1000 milliLiter(s) (75 mL/Hr) IV Continuous <Continuous>  letrozole 2.5 milliGRAM(s) Oral daily  lidocaine   Patch 1 Patch Transdermal daily  meclizine 25 milliGRAM(s) Oral daily  mesalamine DR Capsule 1600 milliGRAM(s) Oral two times a day  montelukast 10 milliGRAM(s) Oral daily  predniSONE   Tablet 5 milliGRAM(s) Oral daily  tiotropium 18 MICROgram(s) Capsule 1 Capsule(s) Inhalation daily    MEDICATIONS  (PRN):  acetaminophen   Tablet .. 650 milliGRAM(s) Oral every 6 hours PRN Mild Pain (1 - 3)  ALBUTerol    90 MICROgram(s) HFA Inhaler 2 Puff(s) Inhalation every 6 hours PRN Shortness of Breath and/or Wheezing  HYDROmorphone  Injectable 1 milliGRAM(s) IV Push every 4 hours PRN Severe Pain (7 - 10)  ketorolac   Injectable 15 milliGRAM(s) IV Push every 6 hours PRN Moderate Pain (4 - 6)  oxyCODONE    IR 5 milliGRAM(s) Oral every 6 hours PRN Mild Pain (1 - 3)      __________________________________________________  REVIEW OF SYSTEMS:    CONSTITUTIONAL: No fever,   EYES: no acute visual disturbances  NECK: No pain or stiffness  RESPIRATORY: No cough; No shortness of breath  CARDIOVASCULAR: No chest pain, no palpitations  GASTROINTESTINAL: No pain. No nausea or vomiting; No diarrhea   NEUROLOGICAL: No headache or numbness, no tremors  MUSCULOSKELETAL: 6/10 pain of R shoulder,   GENITOURINARY: no dysuria, no frequency, no hesitancy  PSYCHIATRY: no depression , no anxiety  ALL OTHER  ROS negative        Vital Signs Last 24 Hrs  T(C): 37 (13 Mar 2020 05:58), Max: 37.4 (12 Mar 2020 14:48)  T(F): 98.6 (13 Mar 2020 05:58), Max: 99.4 (12 Mar 2020 14:48)  HR: 82 (13 Mar 2020 05:58) (81 - 93)  BP: 115/75 (13 Mar 2020 05:58) (111/70 - 115/75)  BP(mean): --  RR: 18 (13 Mar 2020 05:58) (18 - 18)  SpO2: 96% (13 Mar 2020 05:58) (95% - 96%)    ________________________________________________  PHYSICAL EXAM:  GENERAL: NAD  HEENT: Normocephalic;  conjunctivae and sclerae clear; moist mucous membranes;   NECK : supple  CHEST/LUNG: Clear to auscultation bilaterally with good air entry   HEART: S1 S2  regular; no murmurs, gallops or rubs  ABDOMEN: Soft, Nontender, Nondistended; Bowel sounds present  EXTREMITIES: tenderness of R shoulder and R  groin  SKIN: warm and dry; no rash  NERVOUS SYSTEM:  Awake and alert; Oriented  to place, person and time ; no new deficits    _________________________________________________  LABS:                        12.6   8.36  )-----------( 205      ( 13 Mar 2020 07:11 )             38.4     03-13    139  |  105  |  17  ----------------------------<  92  3.8   |  25  |  0.72    Ca    8.6      13 Mar 2020 07:11          CAPILLARY BLOOD GLUCOSE      POCT Blood Glucose.: 97 mg/dL (13 Mar 2020 08:12)  POCT Blood Glucose.: 101 mg/dL (12 Mar 2020 21:18)  POCT Blood Glucose.: 104 mg/dL (12 Mar 2020 16:30)  POCT Blood Glucose.: 123 mg/dL (12 Mar 2020 11:44)        RADIOLOGY & ADDITIONAL TESTS:    Imaging Personally Reviewed:  YES/NO    Consultant(s) Notes Reviewed:   YES/ No    Care Discussed with Consultants :     Plan of care was discussed with patient and /or primary care giver; all questions and concerns were addressed and care was aligned with patient's wishes.

## 2020-03-13 NOTE — PROGRESS NOTE ADULT - ASSESSMENT
88 y/o F from home, lives alone, ambulates with cane  with PMHx of Ulcerative colitis, Asthma, GERD, Asthma and PSH of Bilateral mastectomy presents to the ED s/p fall.   ED vitals stable  EKG NSR  In ED, s/p sling placed on the right arm. s/p Lidocaine patch, Morphine, Oxycodone.     Patient is planned for discharge tomorrow

## 2020-03-13 NOTE — PROGRESS NOTE ADULT - PROBLEM SELECTOR PLAN 4
c/w Mesalamine  Pt takes prednisone 10mg every other day and started on prednisone 5mg daily.
c/w Mesalamine  Pt takes prednisone 10mg every other day and started on prednisone 5mg daily.
R groin pain - WBAT as per ortho  PT consult  Fall precautions  Pain management with Tylenol, Lidocaine patch, Percocet and Morphine  Case management SW for recurrent falls  dc wilkinson catheter

## 2020-03-13 NOTE — DISCHARGE NOTE PROVIDER - NSDCMRMEDTOKEN_GEN_ALL_CORE_FT
Dexilant 60 mg oral delayed release capsule: 1 cap(s) orally once a day  letrozole 2.5 mg oral tablet: 1 tab(s) orally once a day  meclizine 25 mg oral tablet: 1 tab(s) orally once a day  mesalamine 800 mg oral delayed release tablet: 2 tab(s) orally 2 times a day  montelukast 10 mg oral tablet: 1 tab(s) orally once a day  predniSONE 10 mg oral tablet: 1 tab(s) orally Tuesday, Thursday, Saturday cholecalciferol oral tablet: 2000 unit(s) orally once a day  Dexilant 60 mg oral delayed release capsule: 1 cap(s) orally once a day  letrozole 2.5 mg oral tablet: 1 tab(s) orally once a day  meclizine 25 mg oral tablet: 1 tab(s) orally once a day  mesalamine 800 mg oral delayed release tablet: 2 tab(s) orally 2 times a day  montelukast 10 mg oral tablet: 1 tab(s) orally once a day  predniSONE 10 mg oral tablet: 1 tab(s) orally Tuesday, Thursday, Saturday albuterol 90 mcg/inh inhalation aerosol: 2 puff(s) inhaled every 6 hours, As needed, Shortness of Breath and/or Wheezing  cholecalciferol oral tablet: 2000 unit(s) orally once a day  Dexilant 60 mg oral delayed release capsule: 1 cap(s) orally once a day  letrozole 2.5 mg oral tablet: 1 tab(s) orally once a day  meclizine 25 mg oral tablet: 1 tab(s) orally once a day  mesalamine 800 mg oral delayed release tablet: 2 tab(s) orally 2 times a day  montelukast 10 mg oral tablet: 1 tab(s) orally once a day  oxyCODONE 5 mg oral tablet: 1 tab(s) orally every 6 hours, As needed, Mild Pain (1 - 3)  predniSONE 10 mg oral tablet: 1 tab(s) orally Tuesday, Thursday, Saturday  tiotropium 18 mcg inhalation capsule: 1 cap(s) inhaled once a day

## 2020-03-13 NOTE — PROGRESS NOTE ADULT - PROBLEM SELECTOR PLAN 6
independent
IMPROVE VTE Individual Risk Assessment    RISK                                                                Points  [  ] Previous VTE                                                  3  [  ] Thrombophilia                                               2  [  ] Lower limb paralysis                                      2        (unable to hold up >15 seconds)    [  ] Current Cancer                                              2         (within 6 months)  [x ] Immobilization > 24 hrs                                1  [  ] ICU/CCU stay > 24 hours                              1  [x  ] Age > 60                                                      1  IMPROVE VTE Score _2__DVT ppx Lovenox 40 sub q______  )
IMPROVE VTE Individual Risk Assessment    RISK                                                                Points  [  ] Previous VTE                                                  3  [  ] Thrombophilia                                               2  [  ] Lower limb paralysis                                      2        (unable to hold up >15 seconds)    [  ] Current Cancer                                              2         (within 6 months)  [x ] Immobilization > 24 hrs                                1  [  ] ICU/CCU stay > 24 hours                              1  [x  ] Age > 60                                                      1  IMPROVE VTE Score _2__DVT ppx Lovenox 40 sub q______  )
Dexilant 60mg daily

## 2020-03-13 NOTE — PROGRESS NOTE ADULT - SUBJECTIVE AND OBJECTIVE BOX
PULMONARY  progress note    ANTWON ROLON  MRN-[id number]    Patient is a 85y old  Female who presents with a chief complaint of s/p fall (12 Mar 2020 10:21)  c/o severe pain rt shoulder, no sob or chest pain      MEDICATIONS  (STANDING):  cholecalciferol 2000 Unit(s) Oral daily  heparin  Injectable 5000 Unit(s) SubCutaneous every 8 hours  insulin lispro (HumaLOG) corrective regimen sliding scale   SubCutaneous Before meals and at bedtime  lactated ringers. 1000 milliLiter(s) (75 mL/Hr) IV Continuous <Continuous>  letrozole 2.5 milliGRAM(s) Oral daily  lidocaine   Patch 1 Patch Transdermal daily  meclizine 25 milliGRAM(s) Oral daily  mesalamine DR Capsule 1600 milliGRAM(s) Oral two times a day  montelukast 10 milliGRAM(s) Oral daily  predniSONE   Tablet 5 milliGRAM(s) Oral daily  tiotropium 18 MICROgram(s) Capsule 1 Capsule(s) Inhalation daily      MEDICATIONS  (PRN):  acetaminophen   Tablet .. 650 milliGRAM(s) Oral every 6 hours PRN Mild Pain (1 - 3)  ALBUTerol    90 MICROgram(s) HFA Inhaler 2 Puff(s) Inhalation every 6 hours PRN Shortness of Breath and/or Wheezing  HYDROmorphone  Injectable 1 milliGRAM(s) IV Push every 4 hours PRN Severe Pain (7 - 10)  ketorolac   Injectable 15 milliGRAM(s) IV Push every 6 hours PRN Moderate Pain (4 - 6)  oxyCODONE    IR 5 milliGRAM(s) Oral every 6 hours PRN Mild Pain (1 - 3)      Allergies    penicillin (Rash)            PAST MEDICAL & SURGICAL HISTORY:  Asthma  Crohn's disease  H/O bilateral mastectomy           REVIEW OF SYSTEMS:  CONSTITUTIONAL: No fever, weight loss, or fatigue   EYES: No eye pain, visual disturbances, or discharge  ENT:  No difficulty hearing, tinnitus, vertigo; No sinus or throat pain  NECK: No pain or stiffness or nodes  RESPIRATORY:  cough+   wheezing --  chills--   hemoptysis--  Shortness of Breath--  CARDIOVASCULAR: No chest pain, palpitations, passing out, dizziness, or leg swelling  GASTROINTESTINAL: No abdominal or epigastric pain. No nausea, vomiting, or hematemesis; No diarrhea or constipation. No melena or hematochezia.  GENITOURINARY: No dysuria, frequency, hematuria, or incontinence  NEUROLOGICAL: No headaches, memory loss, loss of strength, numbness, or tremors  SKIN: No itching, burning, rashes, or lesions   LYMPH Nodes: No enlarged glands  ENDOCRINE: No heat or cold intolerance; No hair loss  MUSCULOSKELETAL: c/o pain rt shoulder++,  No muscle, back, or extremity pain  PSYCHIATRIC: No depression, anxiety+ mood swings +  ALLERGY AND IMMUNOLOGIC: No hives or eczema    Vital Signs Last 24 Hrs  T(C): 37 (13 Mar 2020 05:58), Max: 37.4 (12 Mar 2020 14:48)  T(F): 98.6 (13 Mar 2020 05:58), Max: 99.4 (12 Mar 2020 14:48)  HR: 82 (13 Mar 2020 05:58) (81 - 93)  BP: 115/75 (13 Mar 2020 05:58) (111/70 - 117/71)  BP(mean): --  RR: 18 (13 Mar 2020 05:58) (18 - 18)  SpO2: 96% (13 Mar 2020 05:58) (95% - 96%)  I&O's Detail    12 Mar 2020 07:01  -  13 Mar 2020 07:00  --------------------------------------------------------  IN:    Oral Fluid: 120 mL  Total IN: 120 mL    OUT:    Indwelling Catheter - Urethral: 1000 mL  Total OUT: 1000 mL    Total NET: -880 mL          PHYSICAL EXAMINATION:    GENERAL: The patient is a well-developed, well-nourished in no apparent distress.   SKIN no rash ecchymoses or bruises  HEENT: Head is normocephalic and atraumatic  MÓNICA , Extraocular muscles are intact. Mucous membranes  moist.   Neck supple ,No LN felt JVP not increased  Thyroid not enlarged  Cardiovascular:  S1 S2 heard, RSR, No JVD , systolic  murmur at apex, No gallop or rub  Respiratory: Chest wall symmetrical with good air entry ,Percussion note normal,    Lungs vesicular breathing with  no  rales or   wheeze	  ABDOMEN:  Soft, Non-tender,  no hepatomegaly or splenomegaly BS positive	  Extremities:  Rt arm immobilized,  No clubbing, cyanosis or edema  Vascular: Peripheral pulses palpable 2+ bilaterally  CNS:  Alert and oriented x 3   Cranial nerves intact  sensory intact  motor power5/5  dtr 2+   Babinski neg    LABS:                        12.6   8.36  )-----------( 205      ( 13 Mar 2020 07:11 )             38.4     03-13    139  |  105  |  17  ----------------------------<  92  3.8   |  25  |  0.72    Ca    8.6      13 Mar 2020 07:11  Phos  3.3     03-11  Mg     2.0     03-11    TPro  7.0  /  Alb  3.6  /  TBili  0.5  /  DBili  x   /  AST  35  /  ALT  52  /  AlkPhos  67  03-11    PT/INR - ( 11 Mar 2020 10:53 )   PT: 12.4 sec;   INR: 1.09 ratio       HbA1C 6.3   TSH 1.28  Folate, Serum: >20.0 ng/mL (03-11-20 @ 15:08)  Vitamin B12, Serum: >2000 pg/mL (03-11-20 @ 15:08)    RADIOLOGY & ADDITIONAL STUDIES:    CT ABD and Pelvis ; < from: CT Pelvis Bony Only No Cont (03.12.20 @ 17:05) >  Colonic diverticulosis. There is a small umbilical hernia   containing a loop of bowel without obstruction or strangulation.   The bladder is decompressed by a Mancuso catheter.   Hysterectomy.   Severe degenerative changes of the visualized lumbar spine with   multilevel degenerative spondylolisthesis, spinal canal stenosis and neural   foraminal narrowings. Mild bilateral hip arthrosis with chondrocalcinosis. There is no evidence of   acute fracture. There is no evidence of joint dislocation. There are scattered enthesopathic bony productive changes.     CT Rt Femur: < from: CT Femur No Cont, Right (03.12.20 @ 17:05) >  No acute fracture or dislocation is demonstrated.  Exam slightly limited at the knee by motion artifact.  Right hip and right knee osteoarthritis.  Small right knee joint effusion.

## 2020-03-13 NOTE — PROGRESS NOTE ADULT - ASSESSMENT
Bronchial Asthma   Fx Rt Humerus  Ulcerative Colitis  GERD  B/L Mastectomies for Cancer      PLAN- Singulair 10 mg hs, Prednisone 5 mg qd  Mesalamine BID   Pulmicort 0.5 mg/2 ml by Nebuliser BID  Ventolin inhaler prn   s/C lovenox   Ortho f/u   P/T   AnalgesicWill Reevaluate in 2-3 days

## 2020-03-13 NOTE — PROGRESS NOTE ADULT - PROBLEM SELECTOR PLAN 1
pt had recurrent falls. X ray showed Right humerus fracture  No surgical intervention as per discussion with Orthopedic Attending  Cosmo WADE as per ortho  c/w sling
pt had recurrent falls. X ray showed Right humerus fracture  No surgical intervention as per discussion with Orthopedic Attending  Cosmo WADE as per ortho  c/w sling
2/2 mechanical fall -  plan d/w ortho team: no surgical intervention  sling in RUE  PT consult

## 2020-03-13 NOTE — PROGRESS NOTE ADULT - ATTENDING COMMENTS
I have examined pt personally Hx chart lab and xrays reviewed and pt discussed with residents
Patient was seen and examined by myself. Case was discussed with house staff in details. I have reviewed and agree with the plan as outlined above with edits where appropriate.    Vital Signs Last 24 Hrs  T(C): 37.4 (12 Mar 2020 21:28), Max: 37.4 (12 Mar 2020 05:35)  T(F): 99.4 (12 Mar 2020 21:28), Max: 99.4 (12 Mar 2020 05:35)  HR: 93 (12 Mar 2020 21:28) (81 - 97)  BP: 111/70 (12 Mar 2020 21:28) (111/70 - 117/71)  RR: 18 (12 Mar 2020 21:28) (18 - 18)  SpO2: 95% (12 Mar 2020 21:28) (94% - 95%)      Pain control  Obtain CT pelvis to assess for acute fracture  DVT prophylaxis  PT as tolerated  Optimize pain control  Other plan as outlined above.  Plan discussed with patient and family in details at bedside and all their questions / concerns were addressed
Patient was seen and examined by myself. Case was discussed with house staff in details. I have reviewed and agree with the plan as outlined above with edits where appropriate.    Vital Signs Last 24 Hrs  T(C): 37.1 (13 Mar 2020 21:44), Max: 37.1 (13 Mar 2020 21:44)  T(F): 98.7 (13 Mar 2020 21:44), Max: 98.7 (13 Mar 2020 21:44)  HR: 83 (13 Mar 2020 21:44) (82 - 98)  BP: 122/70 (13 Mar 2020 21:44) (107/69 - 122/70)  RR: 18 (13 Mar 2020 21:44) (18 - 18)  SpO2: 95% (13 Mar 2020 21:44) (95% - 97%)                            12.6   8.36  )-----------( 205      ( 13 Mar 2020 07:11 )             38.4       03-13    139  |  105  |  17  ----------------------------<  92  3.8   |  25  |  0.72    Ca    8.6      13 Mar 2020 07:11      A/P: 86 y/o F with   1. Acute right humerus fracture s/ mechanical fall  2. acute pain requiring IV/PO opioids  3. Ulcerative colitis  4. Debility with ambulatory dysfunction    clinically stable.  Continue pain control  DVT prophylaxis  Discharge planning - [facility]

## 2020-03-13 NOTE — PROGRESS NOTE ADULT - PROBLEM SELECTOR PLAN 2
Pt p/a she had multiple falls. She was found to have humeral #. She also has pain in R groin but has no # on XRay. will do ct of r leg to r/o definitely   As per ortho rec she is NWB of RUE, WBAT of RLE.   Fall precautions  Pain management with oxycodone, toradol, hydromorphone  As per the history and assessment, she might be having difficulty walking with boots  PT evaluation recommended fro rehab   will give choices for rehab  Ct scan was negative for fracture  plan for discharge tomorrow Pt p/a she had multiple falls. She was found to have humeral #. She also has pain in R groin but has no # on XRay. will do ct of r leg to r/o definitely   As per ortho rec she is NWB of RUE, WBAT of RLE.   Fall precautions  Pain management with oxycodone, toradol, hydromorphone  As per the history and assessment, she might be having difficulty walking with boots  PT evaluation recommended fro rehab, she got accepted at rehab facility  will give choices for rehab  Ct scan was negative for fracture  plan for discharge tomorrow

## 2020-03-13 NOTE — DISCHARGE NOTE PROVIDER - ATTENDING COMMENTS
MEDICAL ATTENDING DISCHARGE NOTE :        Patient is a 85y old  Female who presents with a chief complaint of s/p fall (13 Mar 2020 15:36)            INTERVAL HPI / OVERNIGHT EVENTS: patient with uneventful night and offers no new complaints        ----------------------------------------------------------------------------------    REVIEW OF SYSTEMS: no fever; no SOB            Vital Signs Last 24 Hrs    T(C): 36.8 (14 Mar 2020 14:39), Max: 37.1 (13 Mar 2020 21:44)    T(F): 98.2 (14 Mar 2020 14:39), Max: 98.7 (13 Mar 2020 21:44)    HR: 93 (14 Mar 2020 14:39) (76 - 93)    BP: 135/78 (14 Mar 2020 14:39) (110/71 - 135/78)    BP(mean): --    RR: 18 (14 Mar 2020 14:39) (18 - 18)    SpO2: 97% (14 Mar 2020 14:39) (93% - 97%)        _________________    PHYSICAL EXAM:    ---------------------------     NAD; Normocephalic    LUNGS - no wheezing    HEART: S1 S2+     ABDOMEN: Soft, Nontender, non distended    EXTREMITIES: no cyanosis; no edema            _________________________________________________    LABS:                            12.7     7.55  )-----------( 209      ( 14 Mar 2020 06:31 )               38.3         03-14        137  |  103  |  16    ----------------------------<  104<H>    4.0   |  27  |  0.72        Ca    8.9      14 Mar 2020 06:31                            Plan of care, test results and findings were  discussed with patient ( and HCP &/or primary care giver) ; all questions and concerns were addressed and care was aligned with patient's wishes.    PMD follow up after discharge from the hospital for continued care and outpatient monitoring was advised.    Discharge plans has been  discussed with care team including the housestaff, nursing staff  and discharge planners- ( /  .) MEDICAL ATTENDING DISCHARGE NOTE :        Patient is a 85y old  Female who presents with a chief complaint of s/p fall (13 Mar 2020 15:36)            INTERVAL HPI / OVERNIGHT EVENTS: patient with uneventful night and offers no new complaints        ----------------------------------------------------------------------------------    REVIEW OF SYSTEMS: no fever; no SOB            Vital Signs Last 24 Hrs    T(C): 36.8 (14 Mar 2020 14:39), Max: 37.1 (13 Mar 2020 21:44)    T(F): 98.2 (14 Mar 2020 14:39), Max: 98.7 (13 Mar 2020 21:44)    HR: 93 (14 Mar 2020 14:39) (76 - 93)    BP: 135/78 (14 Mar 2020 14:39) (110/71 - 135/78)    RR: 18 (14 Mar 2020 14:39) (18 - 18)    SpO2: 97% (14 Mar 2020 14:39) (93% - 97%)        _________________    PHYSICAL EXAM:    ---------------------------     NAD; Normocephalic    LUNGS - no wheezing    HEART: S1 S2+     ABDOMEN: Soft, Nontender, non distended    EXTREMITIES: no cyanosis; no edema; RUE in sling+            _________________________________________________    LABS:                            12.7     7.55  )-----------( 209      ( 14 Mar 2020 06:31 )               38.3         03-14        137  |  103  |  16    ----------------------------<  104<H>    4.0   |  27  |  0.72        Ca    8.9      14 Mar 2020 06:31            A/P: 86 y/o F with     1. Acute right humerus fracture s/ mechanical fall    2. Acute pain requiring IV/PO opioids- pain better controlled    3. Ulcerative colitis    4. Debility with ambulatory dysfunction        clinically stable.    Continue pain control    Continue maintenance meds    Discharge planning - [facility] .                             Plan of care, test results and findings were  discussed with patient ( and HCP &/or primary care giver) ; all questions and concerns were addressed and care was aligned with patient's wishes.    PMD follow up after discharge from the hospital for continued care and outpatient monitoring was advised.    Discharge plans has been  discussed with care team including the house staff, nursing staff  and discharge planners- ( /  .)

## 2020-03-13 NOTE — PROGRESS NOTE ADULT - PROBLEM SELECTOR PLAN 5
c/w Dexilant 60mg daily
c/w Dexilant 60mg daily
acute pain due to humerus fracture  con't dilaudid / oxycodone

## 2020-03-13 NOTE — DISCHARGE NOTE PROVIDER - CARE PROVIDER_API CALL
Nancy Ackerman (MD; MPH)  ColonRectal Surgery; Internal Medicine; Preventive Medicine  [address] [place] [address]  Phone: [phone]  Fax: [phone]  Follow Up Time:

## 2020-03-13 NOTE — DISCHARGE NOTE PROVIDER - NSDCCPCAREPLAN_GEN_ALL_CORE_FT
PRINCIPAL DISCHARGE DIAGNOSIS  Diagnosis: Humeral fracture  Assessment and Plan of Treatment: You were found to have humeral fracture. orthopedist recommended non weight bearing for R upper extremity and weight bearing as tolerated for lower extremity. PRINCIPAL DISCHARGE DIAGNOSIS  Diagnosis: Humeral fracture  Assessment and Plan of Treatment: You were found to have humeral fracture. Orthopedist recommended non weight bearing for Right upper extremity and weight bearing as tolerated for lower extremity. CT scan was negative for fracture.  Physical therapy recommended subacute rehab. Take pain medications as advised. Follow with your orthopedecian as an outpatient.      SECONDARY DISCHARGE DIAGNOSES  Diagnosis: Ulcerative colitis  Assessment and Plan of Treatment: you are advised to continue with prednisone daily.    Diagnosis: Osteoporosis  Assessment and Plan of Treatment: You have osteoporosis most likely due to Vitamin deficiency due to Ulcerative colitis and Medication induced like Prednisone and Dexilant and Poor oral intake and due to age. Vit D levels are low and you are started on vit 2000 u daily. You are advised outpatient follow up for Osteoporosis    Diagnosis: GERD (gastroesophageal reflux disease)  Assessment and Plan of Treatment: Continue with Dexilant 60mg daily.

## 2020-03-14 ENCOUNTER — TRANSCRIPTION ENCOUNTER (OUTPATIENT)
Age: 85
End: 2020-03-14

## 2020-03-14 VITALS
DIASTOLIC BLOOD PRESSURE: 78 MMHG | TEMPERATURE: 98 F | HEART RATE: 93 BPM | SYSTOLIC BLOOD PRESSURE: 135 MMHG | RESPIRATION RATE: 18 BRPM | OXYGEN SATURATION: 97 %

## 2020-03-14 LAB
ANION GAP SERPL CALC-SCNC: 7 MMOL/L — SIGNIFICANT CHANGE UP (ref 5–17)
BUN SERPL-MCNC: 16 MG/DL — SIGNIFICANT CHANGE UP (ref 7–18)
CALCIUM SERPL-MCNC: 8.9 MG/DL — SIGNIFICANT CHANGE UP (ref 8.4–10.5)
CHLORIDE SERPL-SCNC: 103 MMOL/L — SIGNIFICANT CHANGE UP (ref 96–108)
CO2 SERPL-SCNC: 27 MMOL/L — SIGNIFICANT CHANGE UP (ref 22–31)
CREAT SERPL-MCNC: 0.72 MG/DL — SIGNIFICANT CHANGE UP (ref 0.5–1.3)
GLUCOSE BLDC GLUCOMTR-MCNC: 101 MG/DL — HIGH (ref 70–99)
GLUCOSE BLDC GLUCOMTR-MCNC: 110 MG/DL — HIGH (ref 70–99)
GLUCOSE SERPL-MCNC: 104 MG/DL — HIGH (ref 70–99)
HCT VFR BLD CALC: 38.3 % — SIGNIFICANT CHANGE UP (ref 34.5–45)
HGB BLD-MCNC: 12.7 G/DL — SIGNIFICANT CHANGE UP (ref 11.5–15.5)
MCHC RBC-ENTMCNC: 31.1 PG — SIGNIFICANT CHANGE UP (ref 27–34)
MCHC RBC-ENTMCNC: 33.2 GM/DL — SIGNIFICANT CHANGE UP (ref 32–36)
MCV RBC AUTO: 93.6 FL — SIGNIFICANT CHANGE UP (ref 80–100)
NRBC # BLD: 0 /100 WBCS — SIGNIFICANT CHANGE UP (ref 0–0)
PLATELET # BLD AUTO: 209 K/UL — SIGNIFICANT CHANGE UP (ref 150–400)
POTASSIUM SERPL-MCNC: 4 MMOL/L — SIGNIFICANT CHANGE UP (ref 3.5–5.3)
POTASSIUM SERPL-SCNC: 4 MMOL/L — SIGNIFICANT CHANGE UP (ref 3.5–5.3)
RBC # BLD: 4.09 M/UL — SIGNIFICANT CHANGE UP (ref 3.8–5.2)
RBC # FLD: 13.6 % — SIGNIFICANT CHANGE UP (ref 10.3–14.5)
SODIUM SERPL-SCNC: 137 MMOL/L — SIGNIFICANT CHANGE UP (ref 135–145)
WBC # BLD: 7.55 K/UL — SIGNIFICANT CHANGE UP (ref 3.8–10.5)
WBC # FLD AUTO: 7.55 K/UL — SIGNIFICANT CHANGE UP (ref 3.8–10.5)

## 2020-03-14 PROCEDURE — 73030 X-RAY EXAM OF SHOULDER: CPT

## 2020-03-14 PROCEDURE — 80061 LIPID PANEL: CPT

## 2020-03-14 PROCEDURE — 80048 BASIC METABOLIC PNL TOTAL CA: CPT

## 2020-03-14 PROCEDURE — 82962 GLUCOSE BLOOD TEST: CPT

## 2020-03-14 PROCEDURE — 36415 COLL VENOUS BLD VENIPUNCTURE: CPT

## 2020-03-14 PROCEDURE — 86901 BLOOD TYPING SEROLOGIC RH(D): CPT

## 2020-03-14 PROCEDURE — 82607 VITAMIN B-12: CPT

## 2020-03-14 PROCEDURE — 86900 BLOOD TYPING SEROLOGIC ABO: CPT

## 2020-03-14 PROCEDURE — 83735 ASSAY OF MAGNESIUM: CPT

## 2020-03-14 PROCEDURE — 73502 X-RAY EXAM HIP UNI 2-3 VIEWS: CPT

## 2020-03-14 PROCEDURE — 80053 COMPREHEN METABOLIC PANEL: CPT

## 2020-03-14 PROCEDURE — 97110 THERAPEUTIC EXERCISES: CPT

## 2020-03-14 PROCEDURE — 96376 TX/PRO/DX INJ SAME DRUG ADON: CPT

## 2020-03-14 PROCEDURE — 93005 ELECTROCARDIOGRAM TRACING: CPT

## 2020-03-14 PROCEDURE — 99285 EMERGENCY DEPT VISIT HI MDM: CPT

## 2020-03-14 PROCEDURE — 97530 THERAPEUTIC ACTIVITIES: CPT

## 2020-03-14 PROCEDURE — 71045 X-RAY EXAM CHEST 1 VIEW: CPT

## 2020-03-14 PROCEDURE — 73700 CT LOWER EXTREMITY W/O DYE: CPT

## 2020-03-14 PROCEDURE — 82306 VITAMIN D 25 HYDROXY: CPT

## 2020-03-14 PROCEDURE — 72192 CT PELVIS W/O DYE: CPT

## 2020-03-14 PROCEDURE — 97116 GAIT TRAINING THERAPY: CPT

## 2020-03-14 PROCEDURE — 96374 THER/PROPH/DIAG INJ IV PUSH: CPT

## 2020-03-14 PROCEDURE — 86850 RBC ANTIBODY SCREEN: CPT

## 2020-03-14 PROCEDURE — 85610 PROTHROMBIN TIME: CPT

## 2020-03-14 PROCEDURE — 82746 ASSAY OF FOLIC ACID SERUM: CPT

## 2020-03-14 PROCEDURE — 84100 ASSAY OF PHOSPHORUS: CPT

## 2020-03-14 PROCEDURE — 83036 HEMOGLOBIN GLYCOSYLATED A1C: CPT

## 2020-03-14 PROCEDURE — 85730 THROMBOPLASTIN TIME PARTIAL: CPT

## 2020-03-14 PROCEDURE — 85027 COMPLETE CBC AUTOMATED: CPT

## 2020-03-14 PROCEDURE — 85652 RBC SED RATE AUTOMATED: CPT

## 2020-03-14 PROCEDURE — 84443 ASSAY THYROID STIM HORMONE: CPT

## 2020-03-14 PROCEDURE — 99239 HOSP IP/OBS DSCHRG MGMT >30: CPT

## 2020-03-14 PROCEDURE — 94640 AIRWAY INHALATION TREATMENT: CPT

## 2020-03-14 RX ORDER — ALBUTEROL 90 UG/1
2 AEROSOL, METERED ORAL
Qty: 0 | Refills: 0 | DISCHARGE
Start: 2020-03-14

## 2020-03-14 RX ORDER — TIOTROPIUM BROMIDE 18 UG/1
1 CAPSULE ORAL; RESPIRATORY (INHALATION)
Qty: 0 | Refills: 0 | DISCHARGE
Start: 2020-03-14

## 2020-03-14 RX ORDER — OXYCODONE HYDROCHLORIDE 5 MG/1
1 TABLET ORAL
Qty: 0 | Refills: 0 | DISCHARGE
Start: 2020-03-14

## 2020-03-14 RX ORDER — CHOLECALCIFEROL (VITAMIN D3) 125 MCG
2000 CAPSULE ORAL
Qty: 0 | Refills: 0 | DISCHARGE
Start: 2020-03-14

## 2020-03-14 RX ADMIN — LIDOCAINE 1 PATCH: 4 CREAM TOPICAL at 11:58

## 2020-03-14 RX ADMIN — HEPARIN SODIUM 5000 UNIT(S): 5000 INJECTION INTRAVENOUS; SUBCUTANEOUS at 05:38

## 2020-03-14 RX ADMIN — BENZOCAINE AND MENTHOL 1 LOZENGE: 5; 1 LIQUID ORAL at 02:29

## 2020-03-14 RX ADMIN — Medication 25 MILLIGRAM(S): at 11:59

## 2020-03-14 RX ADMIN — HYDROMORPHONE HYDROCHLORIDE 1 MILLIGRAM(S): 2 INJECTION INTRAMUSCULAR; INTRAVENOUS; SUBCUTANEOUS at 04:45

## 2020-03-14 RX ADMIN — Medication 1600 MILLIGRAM(S): at 05:39

## 2020-03-14 RX ADMIN — HYDROMORPHONE HYDROCHLORIDE 1 MILLIGRAM(S): 2 INJECTION INTRAMUSCULAR; INTRAVENOUS; SUBCUTANEOUS at 10:26

## 2020-03-14 RX ADMIN — Medication 5 MILLIGRAM(S): at 05:39

## 2020-03-14 RX ADMIN — HYDROMORPHONE HYDROCHLORIDE 1 MILLIGRAM(S): 2 INJECTION INTRAMUSCULAR; INTRAVENOUS; SUBCUTANEOUS at 04:29

## 2020-03-14 RX ADMIN — MONTELUKAST 10 MILLIGRAM(S): 4 TABLET, CHEWABLE ORAL at 11:57

## 2020-03-14 RX ADMIN — HYDROMORPHONE HYDROCHLORIDE 1 MILLIGRAM(S): 2 INJECTION INTRAMUSCULAR; INTRAVENOUS; SUBCUTANEOUS at 10:11

## 2020-03-14 RX ADMIN — Medication 2000 UNIT(S): at 11:57

## 2020-03-17 RX ORDER — MONTELUKAST 4 MG/1
1 TABLET, CHEWABLE ORAL
Qty: 0 | Refills: 0 | DISCHARGE

## 2020-03-17 RX ORDER — MESALAMINE 400 MG
2 TABLET, DELAYED RELEASE (ENTERIC COATED) ORAL
Qty: 0 | Refills: 0 | DISCHARGE

## 2020-03-17 RX ORDER — DEXLANSOPRAZOLE 30 MG/1
1 CAPSULE, DELAYED RELEASE ORAL
Qty: 0 | Refills: 0 | DISCHARGE

## 2020-03-17 RX ORDER — MECLIZINE HCL 12.5 MG
1 TABLET ORAL
Qty: 0 | Refills: 0 | DISCHARGE

## 2020-03-17 RX ORDER — LETROZOLE 2.5 MG/1
1 TABLET, FILM COATED ORAL
Qty: 0 | Refills: 0 | DISCHARGE

## 2020-03-27 ENCOUNTER — APPOINTMENT (OUTPATIENT)
Dept: NEUROSURGERY | Facility: CLINIC | Age: 85
End: 2020-03-27

## 2020-04-03 ENCOUNTER — APPOINTMENT (OUTPATIENT)
Dept: ORTHOPEDIC SURGERY | Facility: CLINIC | Age: 85
End: 2020-04-03

## 2020-10-13 ENCOUNTER — APPOINTMENT (OUTPATIENT)
Dept: CARDIOLOGY | Facility: CLINIC | Age: 85
End: 2020-10-13
Payer: MEDICARE

## 2020-10-13 VITALS
HEART RATE: 103 BPM | TEMPERATURE: 98.3 F | WEIGHT: 190 LBS | HEIGHT: 64 IN | OXYGEN SATURATION: 92 % | SYSTOLIC BLOOD PRESSURE: 127 MMHG | DIASTOLIC BLOOD PRESSURE: 81 MMHG | BODY MASS INDEX: 32.44 KG/M2

## 2020-10-13 DIAGNOSIS — R20.0 ANESTHESIA OF SKIN: ICD-10-CM

## 2020-10-13 DIAGNOSIS — J45.909 UNSPECIFIED ASTHMA, UNCOMPLICATED: ICD-10-CM

## 2020-10-13 DIAGNOSIS — R07.89 OTHER CHEST PAIN: ICD-10-CM

## 2020-10-13 PROCEDURE — 93000 ELECTROCARDIOGRAM COMPLETE: CPT

## 2020-10-13 PROCEDURE — 99214 OFFICE O/P EST MOD 30 MIN: CPT

## 2020-10-14 PROBLEM — K50.90 CROHN'S DISEASE, UNSPECIFIED, WITHOUT COMPLICATIONS: Chronic | Status: ACTIVE | Noted: 2020-03-11

## 2020-10-14 PROBLEM — J45.909 UNSPECIFIED ASTHMA, UNCOMPLICATED: Chronic | Status: ACTIVE | Noted: 2020-03-11

## 2020-10-15 PROBLEM — R07.89 CHEST DISCOMFORT: Status: ACTIVE | Noted: 2018-09-04

## 2020-10-15 PROBLEM — R20.0 NUMBNESS IN FEET: Status: ACTIVE | Noted: 2020-10-13

## 2020-10-15 PROBLEM — J45.909 ASTHMA, UNSPECIFIED ASTHMA SEVERITY, UNSPECIFIED WHETHER COMPLICATED, UNSPECIFIED WHETHER PERSISTENT: Status: ACTIVE | Noted: 2018-05-30

## 2020-10-15 RX ORDER — FLUTICASONE PROPION/SALMETEROL 250-50 MCG
250-50 BLISTER, WITH INHALATION DEVICE INHALATION
Refills: 0 | Status: DISCONTINUED | COMMUNITY
End: 2020-10-15

## 2020-10-15 RX ORDER — MESALAMINE 800 MG/1
800 TABLET, DELAYED RELEASE ORAL
Qty: 150 | Refills: 0 | Status: ACTIVE | COMMUNITY
Start: 2020-09-24

## 2020-10-15 RX ORDER — CIPROFLOXACIN HYDROCHLORIDE 500 MG/1
500 TABLET, FILM COATED ORAL
Qty: 20 | Refills: 0 | Status: DISCONTINUED | COMMUNITY
Start: 2020-04-27 | End: 2020-10-15

## 2020-10-15 RX ORDER — MECLIZINE HYDROCHLORIDE 25 MG/1
25 TABLET ORAL
Qty: 60 | Refills: 0 | Status: ACTIVE | COMMUNITY
Start: 2020-10-05

## 2020-10-15 RX ORDER — FLUTICASONE PROPIONATE AND SALMETEROL 50; 500 UG/1; UG/1
500-50 POWDER RESPIRATORY (INHALATION)
Qty: 60 | Refills: 0 | Status: ACTIVE | COMMUNITY
Start: 2020-09-24

## 2020-10-15 RX ORDER — DEXLANSOPRAZOLE 60 MG/1
60 CAPSULE, DELAYED RELEASE ORAL
Qty: 30 | Refills: 0 | Status: ACTIVE | COMMUNITY
Start: 2020-09-24

## 2020-10-15 RX ORDER — MEGESTROL ACETATE 40 MG/ML
40 SUSPENSION ORAL
Qty: 150 | Refills: 0 | Status: ACTIVE | COMMUNITY
Start: 2020-10-05

## 2020-10-27 ENCOUNTER — APPOINTMENT (OUTPATIENT)
Dept: NEUROLOGY | Facility: CLINIC | Age: 85
End: 2020-10-27
Payer: MEDICARE

## 2020-10-27 VITALS
HEART RATE: 117 BPM | BODY MASS INDEX: 32.61 KG/M2 | WEIGHT: 191 LBS | TEMPERATURE: 97.1 F | HEIGHT: 64 IN | SYSTOLIC BLOOD PRESSURE: 147 MMHG | DIASTOLIC BLOOD PRESSURE: 91 MMHG | OXYGEN SATURATION: 86 %

## 2020-10-27 DIAGNOSIS — R20.0 ANESTHESIA OF SKIN: ICD-10-CM

## 2020-10-27 DIAGNOSIS — M25.511 PAIN IN RIGHT SHOULDER: ICD-10-CM

## 2020-10-27 DIAGNOSIS — M54.9 DORSALGIA, UNSPECIFIED: ICD-10-CM

## 2020-10-27 DIAGNOSIS — L98.9 DISORDER OF THE SKIN AND SUBCUTANEOUS TISSUE, UNSPECIFIED: ICD-10-CM

## 2020-10-27 PROCEDURE — 99205 OFFICE O/P NEW HI 60 MIN: CPT

## 2020-10-27 RX ORDER — METRONIDAZOLE 500 MG/1
500 TABLET ORAL
Qty: 30 | Refills: 0 | Status: DISCONTINUED | COMMUNITY
Start: 2020-09-08 | End: 2020-10-27

## 2020-10-27 RX ORDER — PREDNISONE 10 MG/1
10 TABLET ORAL
Qty: 30 | Refills: 0 | Status: DISCONTINUED | COMMUNITY
Start: 2020-10-05 | End: 2020-10-27

## 2020-10-27 RX ORDER — PANTOPRAZOLE 40 MG/1
40 TABLET, DELAYED RELEASE ORAL DAILY
Qty: 1 | Refills: 3 | Status: DISCONTINUED | COMMUNITY
Start: 2018-10-04 | End: 2020-10-27

## 2020-10-27 RX ORDER — TRAMADOL HYDROCHLORIDE 50 MG/1
50 TABLET, COATED ORAL
Qty: 60 | Refills: 0 | Status: DISCONTINUED | COMMUNITY
Start: 2020-06-24 | End: 2020-10-27

## 2020-10-27 RX ORDER — DICLOFENAC SODIUM 20 MG/G
2 SOLUTION TOPICAL
Qty: 1 | Refills: 0 | Status: DISCONTINUED | COMMUNITY
Start: 2020-01-13 | End: 2020-10-27

## 2020-10-27 RX ORDER — CLOTRIMAZOLE AND BETAMETHASONE DIPROPIONATE 10; .5 MG/G; MG/G
1-0.05 CREAM TOPICAL
Qty: 30 | Refills: 0 | Status: DISCONTINUED | COMMUNITY
Start: 2020-07-17 | End: 2020-10-27

## 2020-10-27 NOTE — ASSESSMENT
[FreeTextEntry1] : Back pain and numbness tingling in the legs concerning for LS radiculopathy.  WIll get Xray of the LS spine and ncs.emg of the legs.  Numbness and tingling in the hands are cw entrapment neuropathy, will get ncs.emg to evaluate.  WIll give Cymbalta 20mg daily for the tingling pain and refer to PT. \par \par Right shoulder pain, will refer to PT.  \par Skin lesions scalp, will refer to derm

## 2020-10-27 NOTE — PHYSICAL EXAM
[General Appearance - Alert] : alert [General Appearance - In No Acute Distress] : in no acute distress [Person] : oriented to person [Place] : oriented to place [Time] : oriented to time [Registration Intact] : recent registration memory intact [Concentration Intact] : normal concentrating ability [Naming Objects] : no difficulty naming common objects [Repeating Phrases] : no difficulty repeating a phrase [Fluency] : fluency intact [Comprehension] : comprehension intact [Vocabulary] : adequate range of vocabulary [Cranial Nerves Optic (II)] : visual acuity intact bilaterally,  visual fields full to confrontation, pupils equal round and reactive to light [Cranial Nerves Oculomotor (III)] : extraocular motion intact [Cranial Nerves Trigeminal (V)] : facial sensation intact symmetrically [Cranial Nerves Facial (VII)] : face symmetrical [Cranial Nerves Vestibulocochlear (VIII)] : hearing was intact bilaterally [Cranial Nerves Glossopharyngeal (IX)] : tongue and palate midline [Cranial Nerves Accessory (XI - Cranial And Spinal)] : head turning and shoulder shrug symmetric [Cranial Nerves Hypoglossal (XII)] : there was no tongue deviation with protrusion [Motor Tone] : muscle tone was normal in all four extremities [Motor Strength] : muscle strength was normal in all four extremities [Involuntary Movements] : no involuntary movements were seen [1+] : Ankle jerk left 1+ [Full Pulse] : the pedal pulses are present [Coordination - Dysmetria Impaired Finger-to-Nose Bilateral] : not present [Coordination - Dysmetria Impaired Heel-to-Shin Bilateral] : not present [Plantar Reflex Right Only] : normal on the right [Plantar Reflex Left Only] : normal on the left [FreeTextEntry5] : fundi not visualized [FreeTextEntry7] : sensory loss to LT and PP in the left digit 5 [FreeTextEntry8] : Antalgic gait

## 2020-10-27 NOTE — REVIEW OF SYSTEMS
[As Noted in HPI] : as noted in HPI [Shortness Of Breath] : shortness of breath [Dysuria] : dysuria [Joint Pain] : joint pain [Fever] : no fever [Anxiety] : no anxiety [Eyesight Problems] : no eyesight problems [Loss Of Hearing] : no hearing loss [Chest Pain] : no chest pain [Vomiting] : no vomiting [Itching] : no itching [Muscle Weakness] : no muscle weakness [Easy Bleeding] : no tendency for easy bleeding

## 2020-10-27 NOTE — HISTORY OF PRESENT ILLNESS
[FreeTextEntry1] : The patient is here for numbness and tingling.  The symptoms initially started in the left leg and then involved the right leg, associated with back pain, started 2019.  More recently the patient started having numbness/ tingling in the left and then the right hand.  She denies any neck pain.  She has right shoulder pain after an injury and although she is right handed she has been using her left hand more.  She denies any weakness in the arms or legs.  She was previously on Neurontin 300mg for pain but was very sedated on it, she declines trial of lower dose of neurontin.\par \par She has noted changes of her skin on the scalp after using a different cream.

## 2020-10-27 NOTE — CONSULT LETTER
[Dear  ___] : Dear  [unfilled], [Consult Letter:] : I had the pleasure of evaluating your patient, [unfilled]. [Please see my note below.] : Please see my note below. [Consult Closing:] : Thank you very much for allowing me to participate in the care of this patient.  If you have any questions, please do not hesitate to contact me. [Sincerely,] : Sincerely, [FreeTextEntry3] : Ruthie Auguste MD, MPH\par

## 2020-10-29 RX ORDER — DULOXETINE HYDROCHLORIDE 20 MG/1
20 CAPSULE, DELAYED RELEASE PELLETS ORAL
Qty: 30 | Refills: 5 | Status: ACTIVE | COMMUNITY
Start: 2020-10-27 | End: 1900-01-01

## 2020-11-03 ENCOUNTER — NON-APPOINTMENT (OUTPATIENT)
Age: 85
End: 2020-11-03

## 2020-11-06 ENCOUNTER — RESULT REVIEW (OUTPATIENT)
Age: 85
End: 2020-11-06

## 2020-11-06 ENCOUNTER — OUTPATIENT (OUTPATIENT)
Dept: OUTPATIENT SERVICES | Facility: HOSPITAL | Age: 85
LOS: 1 days | End: 2020-11-06
Payer: MEDICARE

## 2020-11-06 DIAGNOSIS — Z90.13 ACQUIRED ABSENCE OF BILATERAL BREASTS AND NIPPLES: Chronic | ICD-10-CM

## 2020-11-06 DIAGNOSIS — R07.89 OTHER CHEST PAIN: ICD-10-CM

## 2020-11-06 PROCEDURE — A9502: CPT

## 2020-11-06 PROCEDURE — 93306 TTE W/DOPPLER COMPLETE: CPT | Mod: 26

## 2020-11-06 PROCEDURE — 93016 CV STRESS TEST SUPVJ ONLY: CPT

## 2020-11-06 PROCEDURE — 78452 HT MUSCLE IMAGE SPECT MULT: CPT

## 2020-11-06 PROCEDURE — 93018 CV STRESS TEST I&R ONLY: CPT

## 2020-11-06 PROCEDURE — 93017 CV STRESS TEST TRACING ONLY: CPT

## 2020-11-06 PROCEDURE — 78452 HT MUSCLE IMAGE SPECT MULT: CPT | Mod: 26

## 2020-11-10 NOTE — HISTORY OF PRESENT ILLNESS
[FreeTextEntry1] : 85-year-old female with asthma (last hospitalization in 11/2019) and arthritis who presents for scheduled follow-up visit. Her echo from 09/2019 was unremarkable. Since her last visit, patient has been complaining of occasional chest tightness. This is located midsternally, without radiation, lasting ~ 10 minutes at a time, not related to activity. No associated dyspnea, palpitations, LH, or syncope. She also notes she has exertional dyspnea, which improves when she takes a nebulizer treatment. sees pulmonologist Dr. Harrell who is also her PMD. complains of occasional hand and foot numbness. \par \par

## 2020-11-10 NOTE — ADDENDUM
[FreeTextEntry1] : Addendum 11/10: Patient underwent nuclear stress test 11/6 showing preserved EF and no evidence of ischemia; results d/W patient at completion of testing. Echocardiogram from 11/6 showed atrial septal aneurysm and was otherwise unremarkable, unchanged from prior.

## 2020-11-10 NOTE — ASSESSMENT
[FreeTextEntry1] : 85-year-old female with noted PMH including Crohn's disease, asthma, and BrCA s/p mastectomy, who presents for follow-up visit. She has preserved EF by echo 09/2019.  \par \par 1. Chest pressure: \par -Will send patient for pharmacologic nuclear stress test to rule out ischemic etiology; may be due to her asthma as well (if patient will be wheezing on exam at time of test, will perform dobutamine NST instead of regadenoson)\par \par 2. Dyspnea: Feels better since she was started on steroids and after nebulizer treatments. EF preserved by echo 09/2019. \par \par 3.HLD: Patient is out of benefit range of statin for primary prevention due to age > 75\par -She has routine blood work followed by her PMD; copy of labs requested\par \par 4. ? HTN: May be in setting of steroid use; well controlled on low-dose HCTZ.\par \par 5. UE and LE numbness: Will refer to neurology for further evaluation\par

## 2020-11-25 ENCOUNTER — APPOINTMENT (OUTPATIENT)
Dept: NEUROLOGY | Facility: CLINIC | Age: 85
End: 2020-11-25

## 2020-12-14 ENCOUNTER — APPOINTMENT (OUTPATIENT)
Dept: NEUROLOGY | Facility: CLINIC | Age: 85
End: 2020-12-14

## 2021-01-29 NOTE — H&P ADULT - PROBLEM SELECTOR PLAN 6
Please advise IMPROVE VTE Individual Risk Assessment  RISK                                                         Points  [  ] Previous VTE                                      3  [  ] Thrombophilia                                   2  [  ] Lower limb paralysis                         2 (unable to hold up >15 seconds)    [  ] Current Cancer                                  2       (within 6 months)  [  ] Immobilization > 24 hrs                    1  [  ] ICU/CCU stay > 24 hrs                         1  [x  ] Age > 60                                              1  IMPROVE VTE Score 1  c/w lovenox for dvt ppx

## 2021-04-13 ENCOUNTER — APPOINTMENT (OUTPATIENT)
Dept: CARDIOLOGY | Facility: CLINIC | Age: 86
End: 2021-04-13

## 2021-04-20 ENCOUNTER — APPOINTMENT (OUTPATIENT)
Dept: CARDIOLOGY | Facility: CLINIC | Age: 86
End: 2021-04-20
Payer: MEDICARE

## 2021-04-20 VITALS
DIASTOLIC BLOOD PRESSURE: 66 MMHG | SYSTOLIC BLOOD PRESSURE: 119 MMHG | HEART RATE: 91 BPM | WEIGHT: 182 LBS | HEIGHT: 64 IN | BODY MASS INDEX: 31.07 KG/M2 | OXYGEN SATURATION: 84 % | TEMPERATURE: 97.5 F

## 2021-04-20 DIAGNOSIS — R06.00 DYSPNEA, UNSPECIFIED: ICD-10-CM

## 2021-04-20 DIAGNOSIS — I10 ESSENTIAL (PRIMARY) HYPERTENSION: ICD-10-CM

## 2021-04-20 DIAGNOSIS — E78.5 HYPERLIPIDEMIA, UNSPECIFIED: ICD-10-CM

## 2021-04-20 PROCEDURE — 99214 OFFICE O/P EST MOD 30 MIN: CPT

## 2021-04-21 PROBLEM — I10 BENIGN ESSENTIAL HTN: Status: ACTIVE | Noted: 2021-04-21

## 2021-04-21 PROBLEM — E78.5 HLD (HYPERLIPIDEMIA): Status: ACTIVE | Noted: 2018-05-30

## 2021-04-21 PROBLEM — R06.00 DYSPNEA: Status: ACTIVE | Noted: 2019-09-04

## 2021-04-21 RX ORDER — SUCRALFATE 1 G/1
1 TABLET ORAL
Qty: 90 | Refills: 0 | Status: ACTIVE | COMMUNITY
Start: 2021-04-16

## 2021-04-21 RX ORDER — HYDROCHLOROTHIAZIDE 12.5 MG/1
12.5 CAPSULE ORAL
Qty: 30 | Refills: 0 | Status: DISCONTINUED | COMMUNITY
Start: 2020-08-26 | End: 2021-04-21

## 2021-04-21 RX ORDER — HYDROCHLOROTHIAZIDE 25 MG/1
25 TABLET ORAL
Qty: 30 | Refills: 0 | Status: ACTIVE | COMMUNITY
Start: 2021-04-16

## 2021-04-21 RX ORDER — MELOXICAM 7.5 MG/1
7.5 TABLET ORAL
Qty: 30 | Refills: 0 | Status: ACTIVE | COMMUNITY
Start: 2021-04-16

## 2021-04-21 NOTE — HISTORY OF PRESENT ILLNESS
[FreeTextEntry1] : 86-year-old female with asthma (requiring multiple hospitalizations) and arthritis who presents for scheduled follow-up visit. She had a negative stress test in 11/2020 as well as unremarkable echocardiogram performed as work-up for exertional dyspnea. \par \par Since her last visit, patient reports she still has the same exertional dyspnea as last year, which appears to improve when she takes a nebulizer treatment. Sees pulmonologist Dr. Harrell who is also her PMD. Saw neurologist for paresthesias. \par \par

## 2021-04-21 NOTE — ASSESSMENT
[FreeTextEntry1] : 86-year-old female with noted PMH including Crohn's disease, asthma, and BrCA s/p mastectomy, who presents for follow-up visit. She has preserved EF by echo 11/2020 and negative nuclear stress test.  \par \par 1. Dyspnea: Possible etiology is her asthma and/or deconditioning. Patient has had recent cardiac work-up including negative nuclear stress test and echocardiogram showing intact LV systolic and diastolic function. \par -As such, there is low suspicion for cardiac etiology behind her symptoms. \par \par 2. HLD: On atorvastatin. While technically patient is out of benefit range of statin for primary prevention due to age > 75, prior studies have shown mortality benefit if continuing statin in the very elderly. \par  -She has routine blood work followed by her PMD\par \par 3. HTN: No LVH on recent echocardiogram. \par -Well controlled on HCTZ\par \par FUV 6 months or PRN\par
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
## 2021-10-26 ENCOUNTER — APPOINTMENT (OUTPATIENT)
Dept: CARDIOLOGY | Facility: CLINIC | Age: 86
End: 2021-10-26

## 2022-03-28 NOTE — PATIENT PROFILE ADULT - MEDICATIONS/VISITS
Assessment/Plan:    Uncontrolled type 2 diabetes mellitus with hyperglycemia (HCC)    Lab Results   Component Value Date    HGBA1C 8 3 (H) 03/23/2022   DM type 2 with hyperglycemia and microalbuminuria - uncontrolled with A1C up at 8 3 - urged to get on track with diet/exercise/wgt loss, on max Metformin - will con't and add Jardiance 100 mg 1 tab PO q day, SE reviewed, recheck BW in 3 mos - order given, UTD on foot exam (8/21) and eye exam (5/21 - 2 yrs), on ACE but no statin, will follow    Hypothyroidism  TSH elevated - increase levothyroxine to 75 mcg daily, recheck TFT's in 6 wks - order given, will follow    Hypertension  BP at goal by end of appt, con't current meds, diet/exercise/wgt loss encouraged    Dyslipidemia  FLP not at goal - 10 yr ASCVD risk score reviewed - pt deferring statin, urged to call if she changes her mind, diet/exercise/wgt loss encouraged, will follow    Elevated TSH  Adjusting levothyroxine as noted above, importance of taking thyroid med BY ITSELF w/o other meds/supplements reviewed, will follow    Microalbuminuria  Importance of BP/BS control and ACE use reviewed, will follow       Diagnoses and all orders for this visit:    Uncontrolled type 2 diabetes mellitus with hyperglycemia (HCC)  -     Empagliflozin (Jardiance) 10 MG TABS; Take 1 tablet (10 mg total) by mouth in the morning  -     Hemoglobin A1C; Future  -     Basic metabolic panel; Future  -     Hemoglobin A1C  -     Basic metabolic panel  -     metFORMIN (GLUCOPHAGE) 1000 MG tablet; Take 1 tablet (1,000 mg total) by mouth 2 (two) times a day with meals    Microalbuminuria    Dyslipidemia    Hypothyroidism, unspecified type  -     levothyroxine 75 mcg tablet; Take 1 tablet (75 mcg total) by mouth every morning  -     TSH, 3rd generation with Free T4 reflex; Future  -     TSH, 3rd generation with Free T4 reflex    Elevated TSH  -     levothyroxine 75 mcg tablet;  Take 1 tablet (75 mcg total) by mouth every morning  -     TSH, 3rd generation with Free T4 reflex; Future  -     TSH, 3rd generation with Free T4 reflex    Primary hypertension    Controlled type 2 diabetes mellitus with microalbuminuria, without long-term current use of insulin (HCC)  -     Discontinue: metFORMIN (GLUCOPHAGE) 1000 MG tablet; Take 1 tablet (1,000 mg total) by mouth 2 (two) times a day with meals    Encounter for screening mammogram for malignant neoplasm of breast  -     Mammo screening bilateral w 3d & cad; Future    Medicare annual wellness visit, subsequent    BMI 29 0-29 9,adult  Comments:  Diet/exercise/wgt loss encouraged    Overweight      Colonoscopy 4/21 - 10 yrs    Mammo 6/21 - order given    Dexa 6/21 - osteopenia        Subjective:      Patient ID: Shad Wong is a 76 y o  female  HPI Pt here for follow up appt/BW results and AWV    BW results were d/w pt in detail: FBS/A1C 176/8 3, ALT 33, rest of CMP & CBC was wnl, FLP with TC up at 204, TG up at 353, HDL low at 34 and LDL up at 109, urine microalbumin;Cr ratio was up at 54, TSH was a bit elevated at 7 530 but FT4 was wnl  Def of controlled vs uncontrolled DM was reviewed  Diet was reviewed - wgt down 1 lb from Dec 2021  BMI reviewed  She feels she is doing good with her diet  She was very sedentary over the winter  She is taking her DM meds as directed  She is checking her sugars once daily in the am fasting - average BS is 170's  She is UTD on DM foot exam (8/21) and eye exam (5/21 - 2 yrs)  She is on an ACE but is not on a statin daily  Microalbuminuria and benefit of BS/BP control and ACE inhibitors were reviewed  Goal FLP was d/w pt in detail  Diet/exercise reviewed as noted above  She is not on a statin daily  10 yr ASCVD risk score reviewed at 32 2%  She con't to defer medication as "my stomach has trouble with all those meds"  She notes no stroke/TIA symptoms/CP  Nml range for TSH reviewed    Pt is taking her thyroid medication daily w/o any other meds and prior to eating  She denies any significant wgt changes/fatigue/C/D/tremor/palp/hair loss or skin changes  BP at goal today and meds were reviewed and no changes have occurred  She denies missing doses of meds or SE with the meds  She does not check her BP outside the office  She notes no frequent Ha's/dizziness/double vision/CP  Review of Systems   Constitutional: Negative for chills, fever and unexpected weight change  HENT: Positive for hearing loss  Negative for congestion and trouble swallowing  Eyes: Negative for pain and visual disturbance  Respiratory: Negative for cough and shortness of breath  Cardiovascular: Negative for chest pain, palpitations and leg swelling  Gastrointestinal: Negative for abdominal pain, blood in stool, constipation, diarrhea, nausea and vomiting  Endocrine: Negative for polydipsia and polyuria  Genitourinary: Negative for difficulty urinating, dysuria, vaginal bleeding and vaginal pain  Musculoskeletal: Positive for arthralgias  Negative for back pain and neck pain  Skin: Negative for rash and wound  Neurological: Negative for dizziness, light-headedness and headaches  Hematological: Does not bruise/bleed easily  Psychiatric/Behavioral: Negative for behavioral problems and confusion  Objective:    /86   Pulse 90   Temp 97 5 °F (36 4 °C) (Tympanic)   Ht 5' 3" (1 6 m)   Wt 76 7 kg (169 lb 3 2 oz)   BMI 29 97 kg/m²      Physical Exam  Vitals and nursing note reviewed  Constitutional:       General: She is not in acute distress  Appearance: She is well-developed  She is not ill-appearing  HENT:      Head: Normocephalic and atraumatic  Eyes:      General:         Right eye: No discharge  Left eye: No discharge  Conjunctiva/sclera: Conjunctivae normal    Neck:      Vascular: No carotid bruit  Trachea: No tracheal deviation  Cardiovascular:      Rate and Rhythm: Normal rate and regular rhythm  Heart sounds: Normal heart sounds  No murmur heard  No friction rub  Pulmonary:      Effort: Pulmonary effort is normal  No respiratory distress  Breath sounds: Normal breath sounds  No wheezing, rhonchi or rales  Abdominal:      General: There is no distension  Palpations: Abdomen is soft  Tenderness: There is no abdominal tenderness  There is no guarding or rebound  Musculoskeletal:         General: No deformity or signs of injury  Cervical back: Neck supple  Skin:     General: Skin is warm  Coloration: Skin is not pale  Findings: No rash  Neurological:      General: No focal deficit present  Mental Status: She is alert  Mental status is at baseline  Motor: No abnormal muscle tone  Gait: Gait normal    Psychiatric:         Mood and Affect: Mood normal          Behavior: Behavior normal          Thought Content:  Thought content normal          Judgment: Judgment normal  no

## 2023-07-20 NOTE — DISCHARGE NOTE ADULT - SECONDARY DIAGNOSIS.
Crohn disease Topical Retinoid counseling:  Patient advised to apply a pea-sized amount only at bedtime and wait 30 minutes after washing their face before applying.  If too drying, patient may add a non-comedogenic moisturizer. The patient verbalized understanding of the proper use and possible adverse effects of retinoids.  All of the patient's questions and concerns were addressed.

## 2023-07-23 NOTE — ED ADULT TRIAGE NOTE - TEMPERATURE IN CELSIUS (DEGREES C)
Refill Routing Note   Medication(s) are not appropriate for processing by [facility] for the following reason(s):      Required labs outdated    [facility] action(s):  Defer Care Due:  None identified            Appointments  past 12m or future 3m with PCP    Date Provider   Last Visit   6/12/2023 Moise Aragon MD   Next Visit   Visit date not found Moise Aragon MD   ED visits in past 90 days: 0        Note composed:2:11 AM 07/23/2023          
37

## 2025-03-17 NOTE — PATIENT PROFILE ADULT - NSPROPTRIGHTCAREGIVER_GEN_A_NUR
Patient scheduled for colonoscopy on 5/30 with [facility]  Case request completed/Referral completed   prep to be ordered by clinical team [place]-message routed   Preferred pharmacy selected   instructions reviewed with patient and  sent via patient portal   Robina Rodriguez  verbalized understanding of information given.  Henry Bailey March 17, 2025      
Prep ordered.    
no